# Patient Record
Sex: FEMALE | Race: WHITE | NOT HISPANIC OR LATINO | URBAN - METROPOLITAN AREA
[De-identification: names, ages, dates, MRNs, and addresses within clinical notes are randomized per-mention and may not be internally consistent; named-entity substitution may affect disease eponyms.]

---

## 2021-05-31 ENCOUNTER — INPATIENT (INPATIENT)
Facility: HOSPITAL | Age: 36
LOS: 5 days | Discharge: HOME | End: 2021-06-06
Attending: INTERNAL MEDICINE | Admitting: INTERNAL MEDICINE
Payer: MEDICAID

## 2021-05-31 ENCOUNTER — EMERGENCY (EMERGENCY)
Facility: HOSPITAL | Age: 36
LOS: 1 days | Discharge: HOME | End: 2021-05-31
Attending: EMERGENCY MEDICINE | Admitting: EMERGENCY MEDICINE
Payer: MEDICAID

## 2021-05-31 VITALS
OXYGEN SATURATION: 100 % | RESPIRATION RATE: 18 BRPM | TEMPERATURE: 98 F | SYSTOLIC BLOOD PRESSURE: 122 MMHG | DIASTOLIC BLOOD PRESSURE: 70 MMHG | HEART RATE: 94 BPM

## 2021-05-31 VITALS
RESPIRATION RATE: 18 BRPM | SYSTOLIC BLOOD PRESSURE: 137 MMHG | TEMPERATURE: 98 F | OXYGEN SATURATION: 99 % | DIASTOLIC BLOOD PRESSURE: 91 MMHG | HEART RATE: 97 BPM

## 2021-05-31 DIAGNOSIS — Z98.890 OTHER SPECIFIED POSTPROCEDURAL STATES: Chronic | ICD-10-CM

## 2021-05-31 PROCEDURE — 99283 EMERGENCY DEPT VISIT LOW MDM: CPT

## 2021-05-31 RX ORDER — CEPHALEXIN 500 MG
500 CAPSULE ORAL ONCE
Refills: 0 | Status: COMPLETED | OUTPATIENT
Start: 2021-05-31 | End: 2021-05-31

## 2021-05-31 RX ORDER — CEPHALEXIN 500 MG
1 CAPSULE ORAL
Qty: 21 | Refills: 0
Start: 2021-05-31 | End: 2021-06-06

## 2021-05-31 RX ADMIN — Medication 1 TABLET(S): at 17:04

## 2021-05-31 RX ADMIN — Medication 500 MILLIGRAM(S): at 17:04

## 2021-05-31 NOTE — ED PROVIDER NOTE - OBJECTIVE STATEMENT
36F s/p Brazillian butt lift + liposuction x9 days Dr. Mohr from [place] presents to ED for R leg cellulitis, no purulent discharge from incisional wounds, 6 inch erythematous indurated area of probably cellulitis over R thigh, no fevers/chills/constitutional symptoms, pt not willing to stay for IV abx, will d/c home on new antibiotic regimen, strict return protocols. 36F s/p Brazillian butt lift + liposuction x9 days Dr. Mohr from [place] presents to ED for R leg cellulitis, no purulent discharge from incisional wounds, 6 inch erythematous indurated area  over R thigh, no fevers/chills/constitutional symptoms, no pain with ranging hip, will d/c home on new antibiotic regimen, strict return protocols. 36F s/p Brazillian butt lift + liposuction x9 days Dr. Mohr from [place] presents to ED for R leg swelling x2 days. Pt states she was in [place] until 3 days ago and was examined by Dr. Mohr, sent home, pt was on an oral antibiotic she does not remember the name of which finished yesterday, pt denies fevers/chills, constitutional symptoms. pt states she has had clear discharge from her antterior liposuction inceisions but her posterior incisions have been healing well. Pt states redness over R hip has gotten worse today and her friend suggested she get evaluated in the ED. 36F s/p Brazillian butt lift + liposuction x9 days Dr. Mohr from [place] presents to ED for R leg swelling x2 days. Pt states she was in [place] until 3 days ago and was examined by Dr. Mohr, sent home, pt was on an oral antibiotic she does not remember the name of which finished yesterday, pt denies fevers/chills, constitutional symptoms. pt states she has had clear discharge from her anterior liposuction incisions but her posterior incisions have been healing well. Pt states redness over R hip has gotten worse today and her friend suggested she get evaluated in the ED.    TARUN

## 2021-05-31 NOTE — ED PROVIDER NOTE - NSFOLLOWUPINSTRUCTIONS_ED_ALL_ED_FT
You were seen in the Emergency Department for cellulitis over the R hip.    You are being sent a prescription for Cephalexin to be taken 3 times a day and Bactrim (TMP/SMX) to be taken twice a day for 7 days. Please see medication labels for instructions and warnings.     If you have fever, chills, nausea, vomiting, new or worsening pain, or if you have any new symptoms return to the Emergency Department. You were seen in the Emergency Department for cellulitis over the R hip.    You are being sent a prescription for Cephalexin to be taken 3 times a day and Bactrim (TMP/SMX) to be taken twice a day for 7 days. Please see medication labels for instructions and warnings.     Follow up with your primary care provider within the week.    If you have any concerns about your infection, if it begins to spread beyond the margins marked today, if you have any discharge from your incisions, If you have fevers, chills, weakness, lightheadedness, shortness of breath, nausea, vomiting, new or worsening pain, or if you have any new symptoms return to the Emergency Department.

## 2021-05-31 NOTE — ED ADULT TRIAGE NOTE - CHIEF COMPLAINT QUOTE
pt s/p lipo and BBL surgery in [place] 5/21. was previously seen for cellulitis and told to return if worsening. pt states "I feel warm," unsure if redness is worsening at right upper leg

## 2021-05-31 NOTE — ED PROVIDER NOTE - NS ED ROS FT
Constitutional:  (-) fever, (-) chills, (-) lethargy  Eyes:  (-) eye pain (-) visual changes  ENMT: (-) nasal discharge, (-) sore throat. (-) neck pain or stiffness  Cardiac: (-) chest pain (-) palpitations  Respiratory:  (-) cough (-) respiratory distress.   GI:  (-) nausea (-) vomiting (-) diarrhea (-) abdominal pain.  :  (-) dysuria (-) frequency (-) burning.  MS:  (-) back pain (-) joint pain.  Neuro:  (-) headache (-) numbness (-) tingling (-) focal weakness  Skin:  (+) rash  Except as documented in the HPI,  all other systems are negative

## 2021-05-31 NOTE — ED PROVIDER NOTE - PHYSICAL EXAMINATION
CONSTITUTIONAL: well-appearing, in NAD  SKIN: Large 6 inch area of indurated erythematous skin overlying R hip, well healing surgical scars over anterior abdomen with some serosanguinous discharge, well healing gluteal incisions with dissolvable stitches still in place  HEAD: NCAT  EYES: EOMI, PERRLA, no scleral icterus, conjunctiva pink  ENT: normal pharynx with no erythema or exudates  NECK: Supple; non tender. Full ROM.  CARD: RRR, no murmurs.  RESP: clear to ausculation b/l. No crackles or wheezing.  ABD: soft, non-tender, non-distended, no rebound or guarding.  EXT: no pedal edema, no calf tenderness  NEURO: normal motor. normal sensory. CN II-XII intact. Cerebellar testing normal. Normal gait.  PSYCH: Cooperative, appropriate. CONSTITUTIONAL: well-appearing, in NAD  SKIN: Large 6 inch area of indurated erythematous skin overlying R hip, well healing surgical scars over anterior abdomen with some serosanguinous discharge, well healing gluteal incisions with dissolvable stitches still in place  HEAD: NCAT  EYES: EOMI, PERRLA, no scleral icterus, conjunctiva pink  ENT: normal pharynx with no erythema or exudates  NECK: Supple; non tender. Full ROM.  CARD: RRR, no murmurs.  RESP: clear to ausculation b/l. No crackles or wheezing.  ABD: soft, non-tender, non-distended, no rebound or guarding.  EXT: no pedal edema, no calf tenderness  MSK: full ROM of R hip w/o tenderness   NEURO: normal motor. normal sensory. CN II-XII intact. Cerebellar testing normal. Normal gait.  PSYCH: Cooperative, appropriate.    TARUN

## 2021-05-31 NOTE — ED PROVIDER NOTE - CLINICAL SUMMARY MEDICAL DECISION MAKING FREE TEXT BOX
36F s/p Brazillian butt lift + liposuction x9 days Dr. Mohr from [place] presents to ED for R leg cellulitis, no purulent discharge from incisional wounds, 6 inch erythematous indurated area of probably cellulitis over R thigh, no fevers/chills/constitutional symptoms, pt not willing to stay for IV abx, will d/c home on new antibiotic regimen, strict return protocols. 36F s/p Brazillian butt lift + liposuction x9 days Dr. Mohr from [place] presents to ED for R leg cellulitis, no purulent discharge from incisional wounds, 6 inch erythematous indurated area of likely cellulitis over R thigh, no fevers/chills/constitutional symptoms, pt offered CDU stay for IV abx and monitoring of cellulitis but pt prefered d/c home with oral abx. Strict return precautions discussed such as fever, increased pain, increasing erythema (area marked with pen). d/c on keflex and bactrim

## 2021-05-31 NOTE — ED ADULT NURSE NOTE - OBJECTIVE STATEMENT
Pt AOX4 c/o pain, redness and swelling in Right upper thigh/hip area; pt is s/p liposuction and fat re-distribution surgery performed 5/20 in [place]; pt states she was on PO abx (cannot name the medication) until yesterday; bilateral gluteal fold incisions are dry and approximated; dissolvable sutures still visible; pt has 2 small open incisions to lower abdomen where she states the surgery practice was draining fluid; Left incision site draining small amount ser-sanguinous fluid; Area over trochanter and extending slightly down hip/thigh area is red, firm, tender to touch, approximately 20cm round area of redness noted; no weeping. Pt cannot bear weight on backside so she remains either kneeling or prone in stretcher. Pt AOX4 c/o pain, redness and swelling in Right upper thigh/hip area; pt is s/p liposuction and fat re-distribution to buttocks; surgery performed 5/20 in [place]; pt states she was on PO abx (cannot name the medication) until yesterday; bilateral gluteal fold incisions are dry and approximated; dissolvable sutures still visible; pt has 2 small open incisions to lower abdomen where she states the surgery practice was draining fluid; Left incision site draining small amount ser-sanguinous fluid; Area over trochanter and extending slightly down hip/thigh area is red, firm, tender to touch, approximately 20cm round area of redness noted; no weeping. Pt cannot bear weight on backside so she remains either kneeling or prone in stretcher. Awaiting MD walden and orders.

## 2021-05-31 NOTE — ED PROVIDER NOTE - PATIENT PORTAL LINK FT
You can access the FollowMyHealth Patient Portal offered by [facility] by registering at the following website: [email or web address]. By joining [facility]’s FollowMyHealth portal, you will also be able to view your health information using other applications (apps) compatible with our system.

## 2021-05-31 NOTE — ED ADULT TRIAGE NOTE - CHIEF COMPLAINT QUOTE
pt s/p liposuction and BBL 5/20, c/o area to right upper thigh that feels hard and warm to touch. unable t visualize in triage. denies fever/chills.

## 2021-05-31 NOTE — ED PROVIDER NOTE - PROGRESS NOTE DETAILS
Isaias Smallwood, DO PGY-1: Pt does not want to stay for IV abx, will take oral abx outpt and return if any changes noticed to margins of cellulitis, marked w/ skin pen, pt given strict return precautions

## 2021-05-31 NOTE — ED PROVIDER NOTE - CHIEF COMPLAINT
The patient is a 36y Female complaining of  The patient is a 36y Female complaining of rash over R hip

## 2021-06-01 LAB
ALBUMIN SERPL ELPH-MCNC: 3.9 G/DL — SIGNIFICANT CHANGE UP (ref 3.3–5)
ALP SERPL-CCNC: 61 U/L — SIGNIFICANT CHANGE UP (ref 40–120)
ALT FLD-CCNC: 45 U/L — HIGH (ref 4–33)
ANION GAP SERPL CALC-SCNC: 12 MMOL/L — SIGNIFICANT CHANGE UP (ref 7–14)
AST SERPL-CCNC: 30 U/L — SIGNIFICANT CHANGE UP (ref 4–32)
BASOPHILS # BLD AUTO: 0.01 K/UL — SIGNIFICANT CHANGE UP (ref 0–0.2)
BASOPHILS NFR BLD AUTO: 0.1 % — SIGNIFICANT CHANGE UP (ref 0–2)
BILIRUB SERPL-MCNC: 0.3 MG/DL — SIGNIFICANT CHANGE UP (ref 0.2–1.2)
BUN SERPL-MCNC: 18 MG/DL — SIGNIFICANT CHANGE UP (ref 7–23)
CALCIUM SERPL-MCNC: 11.4 MG/DL — HIGH (ref 8.4–10.5)
CHLORIDE SERPL-SCNC: 102 MMOL/L — SIGNIFICANT CHANGE UP (ref 98–107)
CO2 SERPL-SCNC: 23 MMOL/L — SIGNIFICANT CHANGE UP (ref 22–31)
CREAT SERPL-MCNC: 0.75 MG/DL — SIGNIFICANT CHANGE UP (ref 0.5–1.3)
EOSINOPHIL # BLD AUTO: 0.11 K/UL — SIGNIFICANT CHANGE UP (ref 0–0.5)
EOSINOPHIL NFR BLD AUTO: 1.2 % — SIGNIFICANT CHANGE UP (ref 0–6)
GLUCOSE SERPL-MCNC: 117 MG/DL — HIGH (ref 70–99)
HCT VFR BLD CALC: 30.6 % — LOW (ref 34.5–45)
HGB BLD-MCNC: 9.1 G/DL — LOW (ref 11.5–15.5)
IANC: 7.74 K/UL — SIGNIFICANT CHANGE UP (ref 1.5–8.5)
IMM GRANULOCYTES NFR BLD AUTO: 0.3 % — SIGNIFICANT CHANGE UP (ref 0–1.5)
LYMPHOCYTES # BLD AUTO: 0.61 K/UL — LOW (ref 1–3.3)
LYMPHOCYTES # BLD AUTO: 6.9 % — LOW (ref 13–44)
MCHC RBC-ENTMCNC: 26.1 PG — LOW (ref 27–34)
MCHC RBC-ENTMCNC: 29.7 GM/DL — LOW (ref 32–36)
MCV RBC AUTO: 87.9 FL — SIGNIFICANT CHANGE UP (ref 80–100)
MONOCYTES # BLD AUTO: 0.33 K/UL — SIGNIFICANT CHANGE UP (ref 0–0.9)
MONOCYTES NFR BLD AUTO: 3.7 % — SIGNIFICANT CHANGE UP (ref 2–14)
NEUTROPHILS # BLD AUTO: 7.74 K/UL — HIGH (ref 1.8–7.4)
NEUTROPHILS NFR BLD AUTO: 87.8 % — HIGH (ref 43–77)
NRBC # BLD: 0 /100 WBCS — SIGNIFICANT CHANGE UP
NRBC # FLD: 0 K/UL — SIGNIFICANT CHANGE UP
PLATELET # BLD AUTO: 295 K/UL — SIGNIFICANT CHANGE UP (ref 150–400)
POTASSIUM SERPL-MCNC: 4 MMOL/L — SIGNIFICANT CHANGE UP (ref 3.5–5.3)
POTASSIUM SERPL-SCNC: 4 MMOL/L — SIGNIFICANT CHANGE UP (ref 3.5–5.3)
PROT SERPL-MCNC: 6.6 G/DL — SIGNIFICANT CHANGE UP (ref 6–8.3)
RBC # BLD: 3.48 M/UL — LOW (ref 3.8–5.2)
RBC # FLD: 13.9 % — SIGNIFICANT CHANGE UP (ref 10.3–14.5)
SARS-COV-2 RNA SPEC QL NAA+PROBE: SIGNIFICANT CHANGE UP
SODIUM SERPL-SCNC: 137 MMOL/L — SIGNIFICANT CHANGE UP (ref 135–145)
WBC # BLD: 8.83 K/UL — SIGNIFICANT CHANGE UP (ref 3.8–10.5)
WBC # FLD AUTO: 8.83 K/UL — SIGNIFICANT CHANGE UP (ref 3.8–10.5)

## 2021-06-01 RX ORDER — KETOROLAC TROMETHAMINE 30 MG/ML
15 SYRINGE (ML) INJECTION EVERY 8 HOURS
Refills: 0 | Status: DISCONTINUED | OUTPATIENT
Start: 2021-06-01 | End: 2021-06-03

## 2021-06-01 RX ORDER — ACETAMINOPHEN 500 MG
650 TABLET ORAL ONCE
Refills: 0 | Status: COMPLETED | OUTPATIENT
Start: 2021-06-01 | End: 2021-06-01

## 2021-06-01 RX ORDER — ONDANSETRON 8 MG/1
4 TABLET, FILM COATED ORAL ONCE
Refills: 0 | Status: COMPLETED | OUTPATIENT
Start: 2021-06-01 | End: 2021-06-01

## 2021-06-01 RX ORDER — ACETAMINOPHEN 500 MG
975 TABLET ORAL ONCE
Refills: 0 | Status: COMPLETED | OUTPATIENT
Start: 2021-06-01 | End: 2021-06-01

## 2021-06-01 RX ORDER — CEFAZOLIN SODIUM 1 G
1000 VIAL (EA) INJECTION ONCE
Refills: 0 | Status: COMPLETED | OUTPATIENT
Start: 2021-06-01 | End: 2021-06-01

## 2021-06-01 RX ORDER — CEFAZOLIN SODIUM 1 G
1000 VIAL (EA) INJECTION EVERY 8 HOURS
Refills: 0 | Status: DISCONTINUED | OUTPATIENT
Start: 2021-06-01 | End: 2021-06-02

## 2021-06-01 RX ADMIN — Medication 1 TABLET(S): at 07:33

## 2021-06-01 RX ADMIN — Medication 975 MILLIGRAM(S): at 16:05

## 2021-06-01 RX ADMIN — Medication 650 MILLIGRAM(S): at 16:05

## 2021-06-01 RX ADMIN — Medication 100 MILLIGRAM(S): at 15:57

## 2021-06-01 RX ADMIN — Medication 650 MILLIGRAM(S): at 07:33

## 2021-06-01 RX ADMIN — Medication 100 MILLIGRAM(S): at 09:05

## 2021-06-01 RX ADMIN — Medication 975 MILLIGRAM(S): at 16:35

## 2021-06-01 RX ADMIN — Medication 1 TABLET(S): at 17:46

## 2021-06-01 RX ADMIN — Medication 15 MILLIGRAM(S): at 23:24

## 2021-06-01 RX ADMIN — Medication 100 MILLIGRAM(S): at 01:17

## 2021-06-01 RX ADMIN — ONDANSETRON 4 MILLIGRAM(S): 8 TABLET, FILM COATED ORAL at 05:20

## 2021-06-01 NOTE — ED CDU PROVIDER INITIAL DAY NOTE - ATTENDING CONTRIBUTION TO CARE
36F with no pmh presenting as return visit for worsening cellulitis x2days. Patient seen in ED earlier today for same complaints and advised to return for worsening symptoms. Patient s/p Brazillian butt lift + liposuction x9 days ago from [place]. Patient states was on an unknown single prophylactic oral antibiotic from her plastic surgeon with last dose earlier today. Denies fever, chills, cp, sob, nausea, vomiting, diarrhea, dysuria, headache, weakness    Patient presenting with progression of cellulitis. No signs of collection or abscess. No systemic signs or symptoms. Given rapid progression of borders, to start on IV abx with dispo to CDU for close monitoring.

## 2021-06-01 NOTE — ED ADULT NURSE NOTE - CHIEF COMPLAINT QUOTE
pt s/p lipo and BBL surgery in [place] 5/21. was previously seen for cellulitis and told to return if worsening. pt states "I feel warm," unsure if redness is worsening at right upper leg

## 2021-06-01 NOTE — ED CDU PROVIDER INITIAL DAY NOTE - PROGRESS NOTE DETAILS
ZACK ARZATE: pt resting comfortablyin bed getting iv abx, nga continue ancef, bactrim and reasses Notified patient temp 100.   tylenol 650mg rxed Patient reexamined, some improvement noted form demarcated line on right anterior leg/ thigh.

## 2021-06-01 NOTE — ED PROVIDER NOTE - NS ED ROS FT
GENERAL: No fever or chills  EYES: no change in vision  HEENT: no trouble swallowing or speaking  CARDIAC: no chest pain or palpitations  PULMONARY: no cough or SOB  GI: no abdominal pain, nausea, vomiting, diarrhea, or constipation   : No changes in urination  SKIN: rash   NEURO: no headache, numbness, or weakness  MSK: No joint pain

## 2021-06-01 NOTE — ED ADULT NURSE REASSESSMENT NOTE - NS ED NURSE REASSESS COMMENT FT1
Patient is A&Ox4. Respirations even and unlabored. Dimmed lights for comfort. PO fluids provided. Stretcher in lowest position, Wheels locked, appropriate side rails in place, call bell in reach.

## 2021-06-01 NOTE — ED PROVIDER NOTE - OBJECTIVE STATEMENT
36F with no pmh presenting as return visit for worsening cellulitis. Patient seen in ED earlier today for same complaints and advised to return for worsening symptoms. Patient s/p Brazillian butt lift + liposuction x9 days ago from [place]. Patient was on an oral antibiotic from her plastic surgeon with last dose today with unclear name of prescription. 36F with no pmh presenting as return visit for worsening cellulitis x2days. Patient seen in ED earlier today for same complaints and advised to return for worsening symptoms. Patient s/p Brazillian butt lift + liposuction x9 days ago from [place]. Patient states was on an unknown single prophylactic oral antibiotic from her plastic surgeon with last dose earlier today. Denies fever, chills, cp, sob, nausea, vomiting, diarrhea, dysuria, headache, weakness

## 2021-06-01 NOTE — ED CDU PROVIDER INITIAL DAY NOTE - PHYSICAL EXAMINATION
GEN: NAD, awake, well appearing  HEENT: NCAT, MMM, normal conjunctiva, perrl  CHEST/LUNGS: Non-tachypneic, CTAB, bilateral breath sounds  CARDIAC: Non-tachycardic, s1s2, normal perfusion, no peripheral edema  ABDOMEN: Soft, NTND, No rebound/guarding  MSK: No joint tenderness, no gross deformity of extremities  SKIN: marked area of indurated skin overlying right hip with borders marked from previous visit. New borders remarked. No area of fluctuance. FROM of hip joint without discomfort. surgical scars noted c/d/i ++ warm, + erythematous  NEURO: CN grossly intact, normal coordination, no focal motor or sensory deficits  PSYCH: Alert, appropriate, cooperative  - jemma wilson

## 2021-06-01 NOTE — ED ADULT NURSE NOTE - OBJECTIVE STATEMENT
Pt received in intake room 12. Pt A&Ox4, no pmhx, returning to the ED for worsening cellulitis for the last 2 days. Pt states she was seen in ED earlier in the day for the same complaint and was advised to return for worsening of her symptoms. Pt states she had a brazilian butt lift and liposuction 9 days ago in [place]. Pt denies any cp, sob, abd pain, ha, dizziness, n/v/d, fevers/chills. Respirations even and unlabored, no accessory muscle use. 20G placed to L AC, labs sent. Stretcher in lowest position, side rails up, call bell within reach.

## 2021-06-01 NOTE — ED CDU PROVIDER INITIAL DAY NOTE - OBJECTIVE STATEMENT
36F with no pmh presenting as return visit for worsening cellulitis x2days. Patient seen in ED earlier today for same complaints and advised to return for worsening symptoms. Patient is s/p Brazillian butt lift + liposuction x9 days ago from [place] and has been having right hip pain, erytehma, swelling, warmth, Ppt ha sbeen taking keflex  x 1 dose with no relief, so returned to the ER. On first visit to ER , they performed a bed side u/s which showed no fluid collection. Pt denies any headaches, neck pain, cough,  fever, chills, cp, sob, nausea, vomiting, diarrhea, dysuria, headache, weakness  sent to cdu for abx, reasses

## 2021-06-01 NOTE — ED CDU PROVIDER INITIAL DAY NOTE - NS ED ROS FT
GENERAL: No fever or chills  EYES: no change in vision  HEENT: no trouble swallowing or speaking  CARDIAC: no chest pain or palpitations  PULMONARY: no cough or SOB  GI: no abdominal pain, nausea, vomiting, diarrhea, or constipation   : No changes in urination  SKIN: rash to right hip  NEURO: no headache, numbness, or weakness  MSK: No joint pain  - jemma wilson

## 2021-06-02 DIAGNOSIS — L03.90 CELLULITIS, UNSPECIFIED: ICD-10-CM

## 2021-06-02 DIAGNOSIS — D64.9 ANEMIA, UNSPECIFIED: ICD-10-CM

## 2021-06-02 DIAGNOSIS — Z29.9 ENCOUNTER FOR PROPHYLACTIC MEASURES, UNSPECIFIED: ICD-10-CM

## 2021-06-02 DIAGNOSIS — A41.9 SEPSIS, UNSPECIFIED ORGANISM: ICD-10-CM

## 2021-06-02 DIAGNOSIS — R11.0 NAUSEA: ICD-10-CM

## 2021-06-02 DIAGNOSIS — Z90.49 ACQUIRED ABSENCE OF OTHER SPECIFIED PARTS OF DIGESTIVE TRACT: Chronic | ICD-10-CM

## 2021-06-02 DIAGNOSIS — R51.9 HEADACHE, UNSPECIFIED: ICD-10-CM

## 2021-06-02 LAB
ANION GAP SERPL CALC-SCNC: 9 MMOL/L — SIGNIFICANT CHANGE UP (ref 7–14)
BASOPHILS # BLD AUTO: 0.01 K/UL — SIGNIFICANT CHANGE UP (ref 0–0.2)
BASOPHILS NFR BLD AUTO: 0.1 % — SIGNIFICANT CHANGE UP (ref 0–2)
BUN SERPL-MCNC: 15 MG/DL — SIGNIFICANT CHANGE UP (ref 7–23)
CALCIUM SERPL-MCNC: 10.5 MG/DL — SIGNIFICANT CHANGE UP (ref 8.4–10.5)
CHLORIDE SERPL-SCNC: 102 MMOL/L — SIGNIFICANT CHANGE UP (ref 98–107)
CO2 SERPL-SCNC: 23 MMOL/L — SIGNIFICANT CHANGE UP (ref 22–31)
CREAT SERPL-MCNC: 0.89 MG/DL — SIGNIFICANT CHANGE UP (ref 0.5–1.3)
EOSINOPHIL # BLD AUTO: 0.23 K/UL — SIGNIFICANT CHANGE UP (ref 0–0.5)
EOSINOPHIL NFR BLD AUTO: 2.7 % — SIGNIFICANT CHANGE UP (ref 0–6)
GLUCOSE SERPL-MCNC: 127 MG/DL — HIGH (ref 70–99)
HCT VFR BLD CALC: 30.6 % — LOW (ref 34.5–45)
HGB BLD-MCNC: 9.5 G/DL — LOW (ref 11.5–15.5)
IANC: 7.79 K/UL — SIGNIFICANT CHANGE UP (ref 1.5–8.5)
IMM GRANULOCYTES NFR BLD AUTO: 0.5 % — SIGNIFICANT CHANGE UP (ref 0–1.5)
LYMPHOCYTES # BLD AUTO: 0.28 K/UL — LOW (ref 1–3.3)
LYMPHOCYTES # BLD AUTO: 3.2 % — LOW (ref 13–44)
MCHC RBC-ENTMCNC: 26.5 PG — LOW (ref 27–34)
MCHC RBC-ENTMCNC: 31 GM/DL — LOW (ref 32–36)
MCV RBC AUTO: 85.5 FL — SIGNIFICANT CHANGE UP (ref 80–100)
MONOCYTES # BLD AUTO: 0.29 K/UL — SIGNIFICANT CHANGE UP (ref 0–0.9)
MONOCYTES NFR BLD AUTO: 3.4 % — SIGNIFICANT CHANGE UP (ref 2–14)
NEUTROPHILS # BLD AUTO: 7.79 K/UL — HIGH (ref 1.8–7.4)
NEUTROPHILS NFR BLD AUTO: 90.1 % — HIGH (ref 43–77)
NRBC # BLD: 0 /100 WBCS — SIGNIFICANT CHANGE UP
NRBC # FLD: 0 K/UL — SIGNIFICANT CHANGE UP
PLATELET # BLD AUTO: 228 K/UL — SIGNIFICANT CHANGE UP (ref 150–400)
POTASSIUM SERPL-MCNC: 4.5 MMOL/L — SIGNIFICANT CHANGE UP (ref 3.5–5.3)
POTASSIUM SERPL-SCNC: 4.5 MMOL/L — SIGNIFICANT CHANGE UP (ref 3.5–5.3)
RBC # BLD: 3.58 M/UL — LOW (ref 3.8–5.2)
RBC # FLD: 13.8 % — SIGNIFICANT CHANGE UP (ref 10.3–14.5)
SODIUM SERPL-SCNC: 134 MMOL/L — LOW (ref 135–145)
WBC # BLD: 8.64 K/UL — SIGNIFICANT CHANGE UP (ref 3.8–10.5)
WBC # FLD AUTO: 8.64 K/UL — SIGNIFICANT CHANGE UP (ref 3.8–10.5)

## 2021-06-02 PROCEDURE — 99223 1ST HOSP IP/OBS HIGH 75: CPT

## 2021-06-02 PROCEDURE — 74177 CT ABD & PELVIS W/CONTRAST: CPT | Mod: 26

## 2021-06-02 RX ORDER — OXYCODONE AND ACETAMINOPHEN 5; 325 MG/1; MG/1
1 TABLET ORAL ONCE
Refills: 0 | Status: DISCONTINUED | OUTPATIENT
Start: 2021-06-02 | End: 2021-06-02

## 2021-06-02 RX ORDER — ACETAMINOPHEN 500 MG
650 TABLET ORAL ONCE
Refills: 0 | Status: COMPLETED | OUTPATIENT
Start: 2021-06-02 | End: 2021-06-02

## 2021-06-02 RX ORDER — KETOROLAC TROMETHAMINE 30 MG/ML
15 SYRINGE (ML) INJECTION ONCE
Refills: 0 | Status: DISCONTINUED | OUTPATIENT
Start: 2021-06-02 | End: 2021-06-02

## 2021-06-02 RX ORDER — SENNA PLUS 8.6 MG/1
2 TABLET ORAL AT BEDTIME
Refills: 0 | Status: DISCONTINUED | OUTPATIENT
Start: 2021-06-02 | End: 2021-06-06

## 2021-06-02 RX ORDER — ENOXAPARIN SODIUM 100 MG/ML
40 INJECTION SUBCUTANEOUS EVERY 24 HOURS
Refills: 0 | Status: DISCONTINUED | OUTPATIENT
Start: 2021-06-02 | End: 2021-06-06

## 2021-06-02 RX ORDER — SODIUM CHLORIDE 9 MG/ML
1000 INJECTION INTRAMUSCULAR; INTRAVENOUS; SUBCUTANEOUS
Refills: 0 | Status: DISCONTINUED | OUTPATIENT
Start: 2021-06-02 | End: 2021-06-03

## 2021-06-02 RX ORDER — VANCOMYCIN HCL 1 G
1000 VIAL (EA) INTRAVENOUS EVERY 12 HOURS
Refills: 0 | Status: DISCONTINUED | OUTPATIENT
Start: 2021-06-02 | End: 2021-06-04

## 2021-06-02 RX ORDER — OXYCODONE HYDROCHLORIDE 5 MG/1
5 TABLET ORAL EVERY 6 HOURS
Refills: 0 | Status: DISCONTINUED | OUTPATIENT
Start: 2021-06-02 | End: 2021-06-03

## 2021-06-02 RX ORDER — ACETAMINOPHEN 500 MG
650 TABLET ORAL EVERY 6 HOURS
Refills: 0 | Status: DISCONTINUED | OUTPATIENT
Start: 2021-06-02 | End: 2021-06-06

## 2021-06-02 RX ORDER — SODIUM CHLORIDE 9 MG/ML
1000 INJECTION INTRAMUSCULAR; INTRAVENOUS; SUBCUTANEOUS ONCE
Refills: 0 | Status: COMPLETED | OUTPATIENT
Start: 2021-06-02 | End: 2021-06-02

## 2021-06-02 RX ORDER — METOCLOPRAMIDE HCL 10 MG
10 TABLET ORAL ONCE
Refills: 0 | Status: COMPLETED | OUTPATIENT
Start: 2021-06-02 | End: 2021-06-02

## 2021-06-02 RX ADMIN — Medication 1 TABLET(S): at 05:45

## 2021-06-02 RX ADMIN — Medication 100 MILLIGRAM(S): at 16:08

## 2021-06-02 RX ADMIN — Medication 100 MILLIGRAM(S): at 07:45

## 2021-06-02 RX ADMIN — Medication 15 MILLIGRAM(S): at 08:30

## 2021-06-02 RX ADMIN — Medication 1000 MILLIGRAM(S): at 00:56

## 2021-06-02 RX ADMIN — Medication 650 MILLIGRAM(S): at 23:23

## 2021-06-02 RX ADMIN — SODIUM CHLORIDE 150 MILLILITER(S): 9 INJECTION INTRAMUSCULAR; INTRAVENOUS; SUBCUTANEOUS at 18:54

## 2021-06-02 RX ADMIN — OXYCODONE HYDROCHLORIDE 5 MILLIGRAM(S): 5 TABLET ORAL at 23:36

## 2021-06-02 RX ADMIN — Medication 250 MILLIGRAM(S): at 23:36

## 2021-06-02 RX ADMIN — OXYCODONE AND ACETAMINOPHEN 1 TABLET(S): 5; 325 TABLET ORAL at 00:56

## 2021-06-02 RX ADMIN — Medication 100 MILLIGRAM(S): at 00:18

## 2021-06-02 RX ADMIN — Medication 650 MILLIGRAM(S): at 18:54

## 2021-06-02 RX ADMIN — Medication 10 MILLIGRAM(S): at 08:00

## 2021-06-02 RX ADMIN — Medication 15 MILLIGRAM(S): at 08:04

## 2021-06-02 RX ADMIN — Medication 650 MILLIGRAM(S): at 08:04

## 2021-06-02 RX ADMIN — Medication 650 MILLIGRAM(S): at 22:23

## 2021-06-02 RX ADMIN — Medication 650 MILLIGRAM(S): at 08:30

## 2021-06-02 RX ADMIN — SODIUM CHLORIDE 1000 MILLILITER(S): 9 INJECTION INTRAMUSCULAR; INTRAVENOUS; SUBCUTANEOUS at 15:09

## 2021-06-02 NOTE — ED CDU PROVIDER SUBSEQUENT DAY NOTE - HISTORY
Pt is a 37 y/o F PMHx recent brazillian butt lift and liposuction 10 days ago p/w worsening cellulitis x 3 days.  Pt reports developing gradual onset redness and pain at skin at region of right hip.  Was seen in ED and was prescribed antibiotics.  Pt returned to ED due to continued redness and pain.  Pt denies any fevers, chills, numbness, weakness, chest pain, SOB, numbness, weakness.  Pt was admitted to observation unit for antibiotics.

## 2021-06-02 NOTE — H&P ADULT - NSICDXFAMILYHX_GEN_ALL_CORE_FT
FAMILY HISTORY:  Father  Still living? Unknown  Family history of diabetes mellitus (DM), Age at diagnosis: Age Unknown    Mother  Still living? Unknown  Family history of diabetes mellitus (DM), Age at diagnosis: Age Unknown  Family history of ovarian cancer, Age at diagnosis: Age Unknown

## 2021-06-02 NOTE — H&P ADULT - NSHPPHYSICALEXAM_GEN_ALL_CORE
PHYSICAL EXAM:  GENERAL: NAD, well-developed, well-nourished  HEAD:  Atraumatic, Normocephalic  ENT: MMM  EYES: PERRL, conjunctiva and sclera clear  NECK: Supple, No LAD, no bony spinal TTP  CHEST/LUNG: Clear to auscultation bilaterally; No wheezes, rales or rhonchi  HEART: Regular rate and rhythm; No murmurs, rubs, or gallops, (+)S1, S2  ABDOMEN: Soft, Nontender, Nondistended; Normal Bowel sounds   EXTREMITIES:  2+ Peripheral Pulses, No clubbing, cyanosis, or edema  PSYCH: normal mood and affect  NEUROLOGY: AAOx3, non-focal  SKIN: small, well healing 0.5cm incisions b/l lower abdomen, overlying lower thoracic spine, b/l flanks and at superior gluteal crease without surrounding erythema or drainage; R hip with circumscribed erythema w/fullness at the center, TTP, no drainage appreciated

## 2021-06-02 NOTE — H&P ADULT - NSHPSOCIALHISTORY_GEN_ALL_CORE
Lives alone  Social smoker (no recent use secondary to surgery)  No recent alcohol use (in setting of recent surgery), previously a social drinker  Denies illicit drug use

## 2021-06-02 NOTE — ED CDU PROVIDER SUBSEQUENT DAY NOTE - PROGRESS NOTE DETAILS
CDU course today, patient this am spike temp 100.9, cultures added, CT a/p thigh performer with no abscess or gas formation. blood cultures sent  Erythema is extending out side demarcated line toward flank/ hip on right leg.   Spoke with ID will change patient to vanco. will admit to medicine, for further abx and fu blood cultures

## 2021-06-02 NOTE — H&P ADULT - NSHPLABSRESULTS_GEN_ALL_CORE
9.5    8.64  )-----------( 228      ( 02 Jun 2021 06:42 )             30.6     06-02    134<L>  |  102  |  15  ----------------------------<  127<H>  4.5   |  23  |  0.89    Ca    10.5      02 Jun 2021 06:42    TPro  6.6  /  Alb  3.9  /  TBili  0.3  /  DBili  x   /  AST  30  /  ALT  45<H>  /  AlkPhos  61  06-01 6/01/21 ED urine hCG POCT: Negative    COVID-19 PCR: NotDetec (06.01.21 @ 01:25)    < from: CT Abdomen and Pelvis w/ IV Cont (06.02.21 @ 13:57) >  LOWER CHEST: Breast implants.  LIVER: Within normal limits.  BILE DUCTS: Normal caliber.  GALLBLADDER: Within normal limits.  SPLEEN: Within normal limits.  PANCREAS: Within normal limits.  ADRENALS: Within normal limits.  KIDNEYS/URETERS: Within normal limits.  BLADDER: Nondistended.  REPRODUCTIVE ORGANS: Uterus and adnexa appear unremarkable.  BOWEL: No bowel obstruction. Appendix surgically absent.  PERITONEUM: No ascites.  VESSELS: Within normal limits.  RETROPERITONEUM/LYMPH NODES: Mild enlargement right inguinal and external iliac lymph nodes.  ABDOMINAL WALL: Nonloculated fluid and soft tissue infiltration in the subcutaneous fat of the anterior abdominal wall and both flanks. Skin thickening overlying the right flank. No subcutaneous air. No abscess.  BONES: Within normal limits.  IMPRESSION: Postsurgical changes in the abdominal wall and both flanks. No CT evidence of abscess or gas forming infection.  < end of copied text >    EKG ordered/pending

## 2021-06-02 NOTE — H&P ADULT - HISTORY OF PRESENT ILLNESS
36-year-old female with no significant past medical history, presented from home with R hip erythema. Patient was initially seen in the ED on 5/31 and sent home with PO abx (cephalexin and trimethoprim/sulfamethoxazole). She returned due to worsening erythema on 6/01 and was admitted to the CDU. She spiked a temp of 100.9 and was admitted to medicine. Patient notes recent Brazilian butt lift w/lipo procedure on 5/21/21 in [place]. She was sent home on unknown PO abx (took 3x/day x7 days, completed) and PO analgesics (also completed Rx). She notes she was supposed to be wearing compression garments and getting lymphatic drainage however has not been able to do so since arrival. She reports 1,000/10 holocranial headache (typical of her migraine headaches that she gets occasionally), with associated nausea, as well as fevers/chills, sweating, and neck and back pain described as an ache that she relates to the surgery as well as laying on her stomach (she reports being unable to sleep on her back).     In the CDU VS: 100.9    100-143/51-88  15-19  %RA, rece 36-year-old female with no significant past medical history, presented from home with R hip erythema. Patient was initially seen in the ED on 5/31 and sent home with PO abx (cephalexin and trimethoprim/sulfamethoxazole). She returned due to worsening erythema on 6/01 and was admitted to the CDU. She spiked a temp of 100.9 and was admitted to medicine. Patient notes recent Brazilian butt lift w/lipo procedure on 5/21/21 in [place]. She was sent home on unknown PO abx (took 3x/day x7 days, completed) and PO analgesics (also completed Rx). She notes she was supposed to be wearing compression garments and getting lymphatic drainage however has not been able to do so since arrival. She reports 1,000/10 holocranial headache (typical of her migraine headaches that she gets occasionally), with associated nausea, as well as fevers/chills, sweating, and neck and back pain described as an ache that she relates to the surgery as well as laying on her stomach (she reports being unable to sleep on her back).     In the CDU VS: 100.9    100-143/51-88  15-19  %RA, received trimethoprim/sulfamethoxazole 160/800mg PO x3, cefazolin 1g IVPB x6, acetaminophen 650mg PO x3 and 975mg PO x1, ketorolac 15mg IV x2, ondansetron 4mg IV x1, metoclopramide 10mg IV x1, oxycodone/acetaminophen 5/325mg PO x1, ordered for vancomycin 1g Q12

## 2021-06-02 NOTE — H&P ADULT - PROBLEM SELECTOR PLAN 1
Per ED note: abx discussed with ID, recommended vancomycin  Would f/u with ID in AM regarding further recs  No leukocytosis, will check lactate with AM labs, c/w IVF overnight  CT as above  continue to monitor R hip erythema  f/u Cx

## 2021-06-02 NOTE — ED CDU PROVIDER SUBSEQUENT DAY NOTE - ATTENDING CONTRIBUTION TO CARE
cellulitis to right hip region s/p brazilian butt lift surgery  patient with fever, on IV abx  area red, warmth  FROM at hip    continue IV abx, may need admit if fevers continue

## 2021-06-02 NOTE — H&P ADULT - NSICDXPASTSURGICALHX_GEN_ALL_CORE_FT
PAST SURGICAL HISTORY:  History of appendectomy     S/P cosmetic plastic surgery Breast augmentation (past); Brazilian butt lift/lipo 5/21/21

## 2021-06-02 NOTE — ED CDU PROVIDER DISPOSITION NOTE - CLINICAL COURSE
35 y/o F PMHx recent brazillian butt lift and liposuction 10 days ago p/w worsening cellulitis x 3 days.  Pt reports developing gradual onset redness and pain at skin at region of right hip.  Was seen in ED and was prescribed antibiotics.  Pt returned to ED due to continued redness and pain.   Pt was admitted to observation unit for antibiotics. patient this am spike temp 100.9, cultures added, CT a/p thigh performed with no abscess or gas formation. blood cultures sent  Erythema is extending out side demarcated line toward flank/ hip on right leg.   Spoke with ID will change patient to vanco. will admit to medicine, for further abx and fu blood cultures

## 2021-06-02 NOTE — ED CDU PROVIDER SUBSEQUENT DAY NOTE - PHYSICAL EXAMINATION
Right hip/thigh:  area of induration and erythema with tenderness; no crepitus; + warmth; no fluctuance; soft compartments; distally 5/5 strength; sensation intact to light touch, < 2 sec capillary refill; no LE edema; no calf tenderness; no palpable cords

## 2021-06-02 NOTE — ED CDU PROVIDER SUBSEQUENT DAY NOTE - FAMILY HISTORY
Father  Still living? Unknown  Family history of diabetes mellitus (DM), Age at diagnosis: Age Unknown     Mother  Still living? Unknown  Family history of diabetes mellitus (DM), Age at diagnosis: Age Unknown

## 2021-06-02 NOTE — H&P ADULT - NSHPREVIEWOFSYSTEMS_GEN_ALL_CORE
REVIEW OF SYSTEMS:    CONSTITUTIONAL: (+) fevers/chills  EYES/ENT: No visual changes;  No dysphagia; No sore throat; No rhinorrhea; No sinus pain/pressure  NECK: (+)neck and back pain; No stiffness  RESPIRATORY: No cough, wheezing; No shortness of breath  CARDIOVASCULAR: No chest pain or palpitations; No lower extremity edema  GASTROINTESTINAL: No abdominal or epigastric pain. (+) nausea; No vomiting; No diarrhea; (+) constipation, last BM 2-3 days ago, (+)flatus. No melena or hematochezia.  GENITOURINARY: No dysuria, frequency or hematuria  NEUROLOGICAL: No numbness, paresthesias or weakness; (+)intermittent LH/dizziness with standing; No syncope  MSK: ambulates without aid; No falls; (+)neck/back pain  SKIN: Redness R hip, TTP  All other review of systems is negative unless indicated above.

## 2021-06-02 NOTE — H&P ADULT - PROBLEM SELECTOR PLAN 6
#DVT ppx - enoxaparin given recent sx and decreased mobility  #can consult plastics in AM regarding need for compressive garments

## 2021-06-02 NOTE — ED CDU PROVIDER SUBSEQUENT DAY NOTE - MEDICAL DECISION MAKING DETAILS
Pt is a 35 y/[facility] F [facility] p/w worsening cellulitis x 3 days.  -- cellulitis -- antibiotics, reassess, pain control

## 2021-06-03 LAB
ALBUMIN SERPL ELPH-MCNC: 3.4 G/DL — SIGNIFICANT CHANGE UP (ref 3.3–5)
ALP SERPL-CCNC: 54 U/L — SIGNIFICANT CHANGE UP (ref 40–120)
ALT FLD-CCNC: 20 U/L — SIGNIFICANT CHANGE UP (ref 4–33)
ANION GAP SERPL CALC-SCNC: 11 MMOL/L — SIGNIFICANT CHANGE UP (ref 7–14)
AST SERPL-CCNC: 12 U/L — SIGNIFICANT CHANGE UP (ref 4–32)
BASOPHILS # BLD AUTO: 0.01 K/UL — SIGNIFICANT CHANGE UP (ref 0–0.2)
BASOPHILS NFR BLD AUTO: 0.1 % — SIGNIFICANT CHANGE UP (ref 0–2)
BILIRUB SERPL-MCNC: 0.4 MG/DL — SIGNIFICANT CHANGE UP (ref 0.2–1.2)
BLOOD GAS VENOUS COMPREHENSIVE RESULT: SIGNIFICANT CHANGE UP
BUN SERPL-MCNC: 8 MG/DL — SIGNIFICANT CHANGE UP (ref 7–23)
CALCIUM SERPL-MCNC: 9.9 MG/DL — SIGNIFICANT CHANGE UP (ref 8.4–10.5)
CHLORIDE SERPL-SCNC: 104 MMOL/L — SIGNIFICANT CHANGE UP (ref 98–107)
CO2 SERPL-SCNC: 20 MMOL/L — LOW (ref 22–31)
COVID-19 SPIKE DOMAIN AB INTERP: POSITIVE
COVID-19 SPIKE DOMAIN ANTIBODY RESULT: 13.1 U/ML — HIGH
CREAT SERPL-MCNC: 0.59 MG/DL — SIGNIFICANT CHANGE UP (ref 0.5–1.3)
EOSINOPHIL # BLD AUTO: 0.24 K/UL — SIGNIFICANT CHANGE UP (ref 0–0.5)
EOSINOPHIL NFR BLD AUTO: 3.1 % — SIGNIFICANT CHANGE UP (ref 0–6)
FERRITIN SERPL-MCNC: 107 NG/ML — SIGNIFICANT CHANGE UP (ref 15–150)
GLUCOSE SERPL-MCNC: 129 MG/DL — HIGH (ref 70–99)
HCT VFR BLD CALC: 28 % — LOW (ref 34.5–45)
HGB BLD-MCNC: 8.6 G/DL — LOW (ref 11.5–15.5)
IANC: 6.73 K/UL — SIGNIFICANT CHANGE UP (ref 1.5–8.5)
IMM GRANULOCYTES NFR BLD AUTO: 0.5 % — SIGNIFICANT CHANGE UP (ref 0–1.5)
IRON SATN MFR SERPL: 13 UG/DL — LOW (ref 30–160)
IRON SATN MFR SERPL: 6 % — LOW (ref 14–50)
LYMPHOCYTES # BLD AUTO: 0.45 K/UL — LOW (ref 1–3.3)
LYMPHOCYTES # BLD AUTO: 5.7 % — LOW (ref 13–44)
MAGNESIUM SERPL-MCNC: 1.9 MG/DL — SIGNIFICANT CHANGE UP (ref 1.6–2.6)
MCHC RBC-ENTMCNC: 26.5 PG — LOW (ref 27–34)
MCHC RBC-ENTMCNC: 30.7 GM/DL — LOW (ref 32–36)
MCV RBC AUTO: 86.4 FL — SIGNIFICANT CHANGE UP (ref 80–100)
MONOCYTES # BLD AUTO: 0.38 K/UL — SIGNIFICANT CHANGE UP (ref 0–0.9)
MONOCYTES NFR BLD AUTO: 4.8 % — SIGNIFICANT CHANGE UP (ref 2–14)
NEUTROPHILS # BLD AUTO: 6.73 K/UL — SIGNIFICANT CHANGE UP (ref 1.8–7.4)
NEUTROPHILS NFR BLD AUTO: 85.8 % — HIGH (ref 43–77)
NRBC # BLD: 0 /100 WBCS — SIGNIFICANT CHANGE UP
NRBC # FLD: 0 K/UL — SIGNIFICANT CHANGE UP
PHOSPHATE SERPL-MCNC: 1.6 MG/DL — LOW (ref 2.5–4.5)
PLATELET # BLD AUTO: 226 K/UL — SIGNIFICANT CHANGE UP (ref 150–400)
POTASSIUM SERPL-MCNC: 4.2 MMOL/L — SIGNIFICANT CHANGE UP (ref 3.5–5.3)
POTASSIUM SERPL-SCNC: 4.2 MMOL/L — SIGNIFICANT CHANGE UP (ref 3.5–5.3)
PROT SERPL-MCNC: 6.2 G/DL — SIGNIFICANT CHANGE UP (ref 6–8.3)
RBC # BLD: 3.24 M/UL — LOW (ref 3.8–5.2)
RBC # FLD: 13.7 % — SIGNIFICANT CHANGE UP (ref 10.3–14.5)
SARS-COV-2 IGG+IGM SERPL QL IA: 13.1 U/ML — HIGH
SARS-COV-2 IGG+IGM SERPL QL IA: POSITIVE
SODIUM SERPL-SCNC: 135 MMOL/L — SIGNIFICANT CHANGE UP (ref 135–145)
TIBC SERPL-MCNC: 220 UG/DL — SIGNIFICANT CHANGE UP (ref 220–430)
UIBC SERPL-MCNC: 207 UG/DL — SIGNIFICANT CHANGE UP (ref 110–370)
WBC # BLD: 7.85 K/UL — SIGNIFICANT CHANGE UP (ref 3.8–10.5)
WBC # FLD AUTO: 7.85 K/UL — SIGNIFICANT CHANGE UP (ref 3.8–10.5)

## 2021-06-03 PROCEDURE — 99233 SBSQ HOSP IP/OBS HIGH 50: CPT | Mod: GC

## 2021-06-03 PROCEDURE — 99222 1ST HOSP IP/OBS MODERATE 55: CPT

## 2021-06-03 RX ORDER — FERROUS SULFATE 325(65) MG
325 TABLET ORAL DAILY
Refills: 0 | Status: DISCONTINUED | OUTPATIENT
Start: 2021-06-03 | End: 2021-06-06

## 2021-06-03 RX ORDER — FERROUS SULFATE 325(65) MG
1 TABLET ORAL
Qty: 30 | Refills: 0
Start: 2021-06-03 | End: 2021-07-02

## 2021-06-03 RX ORDER — IBUPROFEN 200 MG
400 TABLET ORAL ONCE
Refills: 0 | Status: COMPLETED | OUTPATIENT
Start: 2021-06-03 | End: 2021-06-03

## 2021-06-03 RX ORDER — KETOROLAC TROMETHAMINE 30 MG/ML
15 SYRINGE (ML) INJECTION EVERY 8 HOURS
Refills: 0 | Status: DISCONTINUED | OUTPATIENT
Start: 2021-06-03 | End: 2021-06-04

## 2021-06-03 RX ORDER — KETOROLAC TROMETHAMINE 30 MG/ML
15 SYRINGE (ML) INJECTION EVERY 8 HOURS
Refills: 0 | Status: DISCONTINUED | OUTPATIENT
Start: 2021-06-03 | End: 2021-06-03

## 2021-06-03 RX ADMIN — Medication 400 MILLIGRAM(S): at 20:48

## 2021-06-03 RX ADMIN — Medication 325 MILLIGRAM(S): at 12:06

## 2021-06-03 RX ADMIN — Medication 400 MILLIGRAM(S): at 21:45

## 2021-06-03 RX ADMIN — Medication 15 MILLIGRAM(S): at 19:58

## 2021-06-03 RX ADMIN — Medication 15 MILLIGRAM(S): at 20:13

## 2021-06-03 RX ADMIN — Medication 1 TABLET(S): at 12:49

## 2021-06-03 RX ADMIN — Medication 15 MILLIGRAM(S): at 07:52

## 2021-06-03 RX ADMIN — Medication 250 MILLIGRAM(S): at 11:39

## 2021-06-03 RX ADMIN — Medication 1 TABLET(S): at 12:06

## 2021-06-03 RX ADMIN — SODIUM CHLORIDE 150 MILLILITER(S): 9 INJECTION INTRAMUSCULAR; INTRAVENOUS; SUBCUTANEOUS at 07:38

## 2021-06-03 RX ADMIN — Medication 1 TABLET(S): at 18:08

## 2021-06-03 RX ADMIN — OXYCODONE HYDROCHLORIDE 5 MILLIGRAM(S): 5 TABLET ORAL at 00:30

## 2021-06-03 RX ADMIN — Medication 15 MILLIGRAM(S): at 08:08

## 2021-06-03 RX ADMIN — Medication 1 TABLET(S): at 18:46

## 2021-06-03 NOTE — DISCHARGE NOTE PROVIDER - CARE PROVIDER_API CALL
[place] [id number]  Phone: [phone]  Fax: [phone]  Established Patient  Follow Up Time: 1 week

## 2021-06-03 NOTE — CONSULT NOTE ADULT - ATTENDING COMMENTS
32 year old presents with right upper leg pain.  Associated fever a.    recent surgery.    Evolving right leg cellulitis. Concern for evolving abscess.     If right leg, drains, please send wound culture    Concern for MRSA    Non-tuberculosis mycobacterium soft tissue infections have been reported after cosmetic procedures- but seems too early    Continue vancomycin    therapeutic drug monitoring of vancomycin  ----check through.  Goal is over 10

## 2021-06-03 NOTE — PROGRESS NOTE ADULT - PROBLEM SELECTOR PLAN 6
#DVT ppx - enoxaparin given recent sx and decreased mobility  #can consult plastics regarding need for compressive garments

## 2021-06-03 NOTE — PROGRESS NOTE ADULT - SUBJECTIVE AND OBJECTIVE BOX
PROGRESS NOTE:   Authored by Manuel Grossman MD, PGY1 [facility] Pager [phone] [facility] pager [phone]    Patient is a 36y old  Female who presents with a chief complaint of post-surgical infection (02 Jun 2021 21:42)      SUBJECTIVE / OVERNIGHT EVENTS:     REVIEW OF SYSTEMS:    CONSTITUTIONAL: No weakness, fevers or chills  EYES/ENT: No visual changes;  No vertigo or throat pain   NECK: No pain or stiffness  RESPIRATORY: No cough, wheezing, hemoptysis; No shortness of breath  CARDIOVASCULAR: No chest pain or palpitations  GASTROINTESTINAL: No abdominal or epigastric pain. No nausea, vomiting, or hematemesis; No diarrhea or constipation. No melena or hematochezia.  GENITOURINARY: No dysuria, frequency or hematuria  NEUROLOGICAL: No numbness or weakness  SKIN: No itching, rashes    MEDICATIONS  (STANDING):  enoxaparin Injectable 40 milliGRAM(s) SubCutaneous every 24 hours  sodium chloride 0.9%. 1000 milliLiter(s) (150 mL/Hr) IV Continuous <Continuous>  vancomycin  IVPB 1000 milliGRAM(s) IV Intermittent every 12 hours    MEDICATIONS  (PRN):  acetaminophen   Tablet .. 650 milliGRAM(s) Oral every 6 hours PRN Temp greater or equal to 38C (100.4F), Mild Pain (1 - 3)  ketorolac   Injectable 15 milliGRAM(s) IV Push every 8 hours PRN Moderate Pain (4 - 6)  oxyCODONE    IR 5 milliGRAM(s) Oral every 6 hours PRN Moderate to Severe Pain (4 - 10)  senna 2 Tablet(s) Oral at bedtime PRN Constipation      CAPILLARY BLOOD GLUCOSE        I&O's Summary      PHYSICAL EXAM:  Vital Signs Last 24 Hrs  T(C): 37.4 (03 Jun 2021 06:26), Max: 38.4 (02 Jun 2021 22:15)  T(F): 99.3 (03 Jun 2021 06:26), Max: 101.1 (02 Jun 2021 22:15)  HR: 95 (03 Jun 2021 06:26) (74 - 105)  BP: 114/68 (03 Jun 2021 06:26) (100/59 - 114/68)  BP(mean): --  RR: 19 (03 Jun 2021 06:26) (15 - 19)  SpO2: 100% (03 Jun 2021 06:26) (96% - 100%)    CONSTITUTIONAL: NAD, well-developed  RESPIRATORY: Normal respiratory effort; lungs are clear to auscultation bilaterally  CARDIOVASCULAR: Regular rate and rhythm, normal S1 and S2, no murmur/rub/gallop; No lower extremity edema; Peripheral pulses are 2+ bilaterally  ABDOMEN: Nontender to palpation, normoactive bowel sounds, no rebound/guarding; No hepatosplenomegaly  MUSCLOSKELETAL: no clubbing or cyanosis of digits; no joint swelling or tenderness to palpation  PSYCH: A+O to person, place, and time; affect appropriate  NEURO: Non-focal, no tremors  SKIN: No rashes    LABS:                        9.5    8.64  )-----------( 228      ( 02 Jun 2021 06:42 )             30.6     06-02    134<L>  |  102  |  15  ----------------------------<  127<H>  4.5   |  23  |  0.89    Ca    10.5      02 Jun 2021 06:42                  RADIOLOGY & ADDITIONAL TESTS:  No new imaging or tests    COORDINATION OF CARE:  Care Discussed with Consultants/Other Providers [Y/N]:  Prior or Outpatient Records Reviewed [Y/N]:   PROGRESS NOTE:   Authored by Manuel Grossman MD, PGY1 [facility] Pager [phone] [facility] pager [phone]    Patient is a 36y old  Female who presents with a chief complaint of post-surgical infection (02 Jun 2021 21:42)      SUBJECTIVE / OVERNIGHT EVENTS: No acute events. Pt feels overall "like crap." Main symptoms are headache, which she gets chronically daily and only typically relieved by excedrin, and back pain, which she relates to laying on her stomach. Hip hurts as well. Has some mild nausea, which she also relates to laying on her stomach, but no abdominal pain and no vomiting. No diarrhea/constipation. No chest pain or SOB. no numbness or tingling in extremities.     REVIEW OF SYSTEMS:    CONSTITUTIONAL: No weakness, fevers or chills.  EYES/ENT: No visual changes;  No vertigo or throat pain. +Headache   NECK: No pain or stiffness  RESPIRATORY: No cough, wheezing, hemoptysis; No shortness of breath  CARDIOVASCULAR: No chest pain or palpitations  GASTROINTESTINAL: No abdominal or epigastric pain. Mild nausea without vomiting or hematemesis; No diarrhea or constipation. No melena or hematochezia.  GENITOURINARY: No dysuria, frequency or hematuria  NEUROLOGICAL: No numbness or weakness  SKIN: No itching, rashes    MEDICATIONS  (STANDING):  enoxaparin Injectable 40 milliGRAM(s) SubCutaneous every 24 hours  sodium chloride 0.9%. 1000 milliLiter(s) (150 mL/Hr) IV Continuous <Continuous>  vancomycin  IVPB 1000 milliGRAM(s) IV Intermittent every 12 hours    MEDICATIONS  (PRN):  acetaminophen   Tablet .. 650 milliGRAM(s) Oral every 6 hours PRN Temp greater or equal to 38C (100.4F), Mild Pain (1 - 3)  ketorolac   Injectable 15 milliGRAM(s) IV Push every 8 hours PRN Moderate Pain (4 - 6)  oxyCODONE    IR 5 milliGRAM(s) Oral every 6 hours PRN Moderate to Severe Pain (4 - 10)  senna 2 Tablet(s) Oral at bedtime PRN Constipation      CAPILLARY BLOOD GLUCOSE        I&O's Summary      PHYSICAL EXAM:  Vital Signs Last 24 Hrs  T(C): 37.4 (03 Jun 2021 06:26), Max: 38.4 (02 Jun 2021 22:15)  T(F): 99.3 (03 Jun 2021 06:26), Max: 101.1 (02 Jun 2021 22:15)  HR: 95 (03 Jun 2021 06:26) (74 - 105)  BP: 114/68 (03 Jun 2021 06:26) (100/59 - 114/68)  BP(mean): --  RR: 19 (03 Jun 2021 06:26) (15 - 19)  SpO2: 100% (03 Jun 2021 06:26) (96% - 100%)    CONSTITUTIONAL:  well-developed, mildly uncomfortable appearing.  RESPIRATORY: Normal respiratory effort; lungs are clear to auscultation bilaterally  CARDIOVASCULAR: Regular rate and rhythm, normal S1 and S2, no murmur/rub/gallop; No lower extremity edema; Peripheral pulses are 2+ bilaterally  ABDOMEN: Nontender to palpation, normoactive bowel sounds, no rebound/guarding; No hepatosplenomegaly  MUSCLOSKELETAL: no clubbing or cyanosis of digits; no joint swelling or tenderness to palpation  PSYCH: A+O to person, place, and time; affect appropriate  NEURO: Non-focal, no tremors  SKIN: No rashes. R hip erythema TTP with pustules at center. Erythema mildly receded from outer line of demarcation.     LABS:                        9.5    8.64  )-----------( 228      ( 02 Jun 2021 06:42 )             30.6     06-02    134<L>  |  102  |  15  ----------------------------<  127<H>  4.5   |  23  |  0.89    Ca    10.5      02 Jun 2021 06:42                  RADIOLOGY & ADDITIONAL TESTS:  No new imaging or tests    COORDINATION OF CARE:  Care Discussed with Consultants/Other Providers [Y/N]:  Prior or Outpatient Records Reviewed [Y/N]:

## 2021-06-03 NOTE — PROGRESS NOTE ADULT - PROBLEM SELECTOR PLAN 4
unknown baseline, s/p recent sx  check iron/TIBC, ferritin with AM labs, monitor/trend unknown baseline, s/p recent sx  -Iron very low, will start iron tabs  -F/U B12/Folate  -monitor/trend

## 2021-06-03 NOTE — CONSULT NOTE ADULT - ASSESSMENT
Assessment:  The patient is a 36 year old female with no significant past medical history who presented from home with right hip erythema. She was initially seen in the emergency department at [facility] on 5/31 and sent home with PO cephalexin and trimethoprim/sulfamethoxazole. She returned due to worsening erythema on 6/01 and was admitted to the CDU. She had a fever of 100.9. She reports that she underwent a recent Brazilian butt lift with liposuction procedure on 5/21/21 in [place]. She was sent home on unknown oral antibiotics (took 3 pills a day for 7 days with oral analgesics. She notes she was supposed to be wearing compression garments and getting lymphatic drainage however was not been able to do so since arrival. She was admitted for post-surgical infection of the thigh and anterior abdominal wall and was started on intravenous antibiotics. Infectious disease was consulted for further recommendations.       Plan:  # Post-surgical infection  - Continue intravenous vancomycin for now  - Await final blood cultures x 2 with sensitivities  - COVID-19 Esa Ab pending  - Monitor fever curve  - Trend CBC and CMP daily  - ID will continue to follow    Case not yet discussed with attending      Eros Villegas MD PGY-4   Fellow, Infectious Diseases   Pager: [phone]  If no response, after 5pm and on weekends: Call [phone]   Assessment:  The patient is a 36 year old female with no significant past medical history who presented from home with right hip erythema. She was initially seen in the emergency department at [facility] on 5/31 and sent home with PO cephalexin and trimethoprim/sulfamethoxazole. She returned due to worsening erythema on 6/01 and was admitted to the CDU. She had a fever of 100.9. She reports that she underwent a recent Brazilian butt lift with liposuction procedure on 5/21/21 in [place]. She was sent home on unknown oral antibiotics (took 3 pills a day for 7 days with oral analgesics. She notes she was supposed to be wearing compression garments and getting lymphatic drainage however was not been able to do so since arrival. She was admitted for post-surgical infection of the thigh and anterior abdominal wall and was started on intravenous antibiotics. Infectious disease was consulted for further recommendations.       Plan:  # Post-surgical right thigh cellulitis   - Continue intravenous vancomycin for now  - Await final blood cultures x 2 with sensitivities  - Recommend plastic surgery evaluation   - COVID-19 Esa Ab pending  - Monitor fever curve  - Trend CBC and CMP daily  - ID will continue to follow    Case seen and discussed with MD Eros Boss MD PGY-4   Fellow, Infectious Diseases   Pager: [phone]  If no response, after 5pm and on weekends: Call [phone]

## 2021-06-03 NOTE — DISCHARGE NOTE PROVIDER - NSFOLLOWUPCLINICS_GEN_ALL_ED_FT
[facility] - Infectious Disease  Infectious Disease  [address], [place] [address]  Phone: [phone]  Fax:   Follow Up Time: 2 weeks

## 2021-06-03 NOTE — DISCHARGE NOTE PROVIDER - NSDCMRMEDTOKEN_GEN_ALL_CORE_FT
ferrous sulfate 325 mg (65 mg elemental iron) oral tablet: 1 tab(s) orally once a day   Bactrim  mg-160 mg oral tablet: 1 tab(s) orally 2 times a day   cefadroxil 500 mg oral capsule: 1 cap(s) orally 2 times a day   ferrous sulfate 325 mg (65 mg elemental iron) oral tablet: 1 tab(s) orally once a day

## 2021-06-03 NOTE — PROGRESS NOTE ADULT - PROBLEM SELECTOR PLAN 5
EKG pending for QT prior to further anti-emetics Rx, f/u #DVT ppx - enoxaparin given recent sx and decreased mobility  #Diet, regular

## 2021-06-03 NOTE — DISCHARGE NOTE PROVIDER - HOSPITAL COURSE
HPI:  36-year-old female with no significant past medical history, presented from home with R hip erythema. Patient was initially seen in the ED on 5/31 and sent home with PO abx (cephalexin and trimethoprim/sulfamethoxazole). She returned due to worsening erythema on 6/01 and was admitted to the CDU. She spiked a temp of 100.9 and was admitted to medicine. Patient notes recent Brazilian butt lift w/lipo procedure on 5/21/21 in [place]. She was sent home on unknown PO abx (took 3x/day x7 days, completed) and PO analgesics (also completed Rx). She notes she was supposed to be wearing compression garments and getting lymphatic drainage however has not been able to do so since arrival. She reports 1,000/10 holocranial headache (typical of her migraine headaches that she gets occasionally), with associated nausea, as well as fevers/chills, sweating, and neck and back pain described as an ache that she relates to the surgery as well as laying on her stomach (she reports being unable to sleep on her back).     In the CDU VS: 100.9    100-143/51-88  15-19  %RA, received trimethoprim/sulfamethoxazole 160/800mg PO x3, cefazolin 1g IVPB x6, acetaminophen 650mg PO x3 and 975mg PO x1, ketorolac 15mg IV x2, ondansetron 4mg IV x1, metoclopramide 10mg IV x1, oxycodone/acetaminophen 5/325mg PO x1, ordered for vancomycin 1g Q12  (02 Jun 2021 21:42)    Hospital course: Pt continued on vanc. ID followed during stay. Wound cx _______. Plastic surgery consulted as well. For migraines, patient treated with IV toradol and esgic. Pt. improved during hospital stay. Pt is stable and is clear for discharge. She should follow up with her PCP within one week for repeat labs and checkup. HPI:  36-year-old female with no significant past medical history, presented from home with R hip erythema. Patient was initially seen in the ED on 5/31 and sent home with PO abx (cephalexin and trimethoprim/sulfamethoxazole). She returned due to worsening erythema on 6/01 and was admitted to the CDU. She spiked a temp of 100.9 and was admitted to medicine. Patient notes recent Brazilian butt lift w/lipo procedure on 5/21/21 in [place]. She was sent home on unknown PO abx (took 3x/day x7 days, completed) and PO analgesics (also completed Rx). She notes she was supposed to be wearing compression garments and getting lymphatic drainage however has not been able to do so since arrival. She reports 1,000/10 holocranial headache (typical of her migraine headaches that she gets occasionally), with associated nausea, as well as fevers/chills, sweating, and neck and back pain described as an ache that she relates to the surgery as well as laying on her stomach (she reports being unable to sleep on her back).     In the CDU VS: 100.9    100-143/51-88  15-19  %RA, received trimethoprim/sulfamethoxazole 160/800mg PO x3, cefazolin 1g IVPB x6, acetaminophen 650mg PO x3 and 975mg PO x1, ketorolac 15mg IV x2, ondansetron 4mg IV x1, metoclopramide 10mg IV x1, oxycodone/acetaminophen 5/325mg PO x1, ordered for vancomycin 1g Q12  (02 Jun 2021 21:42)    Hospital course: Pt continued on vanc. ID followed during stay. Wound cx with no growth. Bcx negative. Plastic surgery consulted as well. For migraines, patient treated with motrin and esgic. Pt. improved during hospital stay. Pt is stable and is clear for discharge. She should follow up with her PCP within one week for repeat labs and checkup.

## 2021-06-03 NOTE — DISCHARGE NOTE PROVIDER - NSDCCPCAREPLAN_GEN_ALL_CORE_FT
PRINCIPAL DISCHARGE DIAGNOSIS  Diagnosis: Cellulitis  Assessment and Plan of Treatment: You came in with an infection in the superficial aspect of your thigh after your recent procedure. We treated you with IV antibiotics and had the plastic surgery team evaluate your thigh. You should continue taking your antibiotics as prescribed and follow up with your PCP within one week.      SECONDARY DISCHARGE DIAGNOSES  Diagnosis: Normocytic anemia  Assessment and Plan of Treatment: You were found to be anemic in the hospital, which can cause symptoms of fatigue, fast heart rate, and palpitations among other symptoms. This is most likely chronic and secondary to low iron. We sent iron tablets to your pharmacy, which you should take daily. Note that taking iron can cause constipation, so if you experience this side effect, consult your PCP who can recommend/prescribe medications to make you less constipated. You should follow up with your PCP for repeat labwork.     PRINCIPAL DISCHARGE DIAGNOSIS  Diagnosis: Cellulitis  Assessment and Plan of Treatment: You came in with an infection in the superficial aspect of your thigh after your recent procedure. We treated you with IV antibiotics and had the plastic surgery team evaluate your thigh. You should continue taking your antibiotics as prescribed and follow up with your PCP within one week.  Please start your first dose of both antibiotics tonight, and take both in the AM and PM until gone.      SECONDARY DISCHARGE DIAGNOSES  Diagnosis: Normocytic anemia  Assessment and Plan of Treatment: You were found to be anemic in the hospital, which can cause symptoms of fatigue, fast heart rate, and palpitations among other symptoms. This is most likely chronic and secondary to low iron. We sent iron tablets to your pharmacy, which you should take daily. Note that taking iron can cause constipation, so if you experience this side effect, consult your PCP who can recommend/prescribe medications to make you less constipated. You should follow up with your PCP for repeat labwork.

## 2021-06-03 NOTE — CONSULT NOTE ADULT - SUBJECTIVE AND OBJECTIVE BOX
Patient is a 36 year old female who presents with a chief complaint of thigh pain. (03 Jun 2021 07:05)    HPI:  The patient is a 36 year old female with no significant past medical history who presented from home with right hip erythema. She was initially seen in the emergency department at [facility] on 5/31 and sent home with PO cephalexin and trimethoprim/sulfamethoxazole. She returned due to worsening erythema on 6/01 and was admitted to the CDU. She had a fever of 100.9. She reports that she underwent a recent Brazilian butt lift with liposuction procedure on 5/21/21 in [place]. She was sent home on unknown oral antibiotics (took 3 pills a day for 7 days with oral analgesics. She notes she was supposed to be wearing compression garments and getting lymphatic drainage however was not been able to do so since arrival. She reports an associated holocranial headache (typical of her migraine headaches that she gets occasionally), with associated nausea, as well as fevers, chills, sweating, and neck and back pain described as an ache that she relates to the surgery as well as laying on her stomach (she reports being unable to sleep on her back).     CBC showed normocytic anemia otherwise unremarkable with neutrophilia. CMP showed hyperglycemia otherwise unremarkable. COVID-19 PCR was negative. CT abdomen and pelvis with IV contrast showed mild enlargement right inguinal and external iliac lymph nodes with non-loculated fluid and soft tissue infiltration in the subcutaneous fat of the anterior abdominal wall and both flanks with skin thickening overlying the right flank. No subcutaneous air or abscess was noted. She received intravenous cefazolin and started on intravenous vancomycin. Blood cultures x 2 and COVID-19 Esa Ab are pending. Infectious disease was consulted for further recommendations.       prior hospital charts reviewed [x]  primary team notes reviewed [x]  other consultant notes reviewed [x]    PAST MEDICAL & SURGICAL HISTORY:  Headache, migraine  S/P cosmetic plastic surgery  Breast augmentation (past); Brazilian butt lift and liposuction 5/21/21  History of appendectomy        Allergies  penicillin (Hives)    ANTIMICROBIALS (past 90 days)  MEDICATIONS  (STANDING):  ceFAZolin   IVPB   100 mL/Hr IV Intermittent (06-01-21 @ 01:17)    ceFAZolin   IVPB   100 mL/Hr IV Intermittent (06-02-21 @ 16:08)   100 mL/Hr IV Intermittent (06-02-21 @ 07:45)   100 mL/Hr IV Intermittent (06-02-21 @ 00:18)   100 mL/Hr IV Intermittent (06-01-21 @ 15:57)   100 mL/Hr IV Intermittent (06-01-21 @ 09:05)    trimethoprim  160 mG/sulfamethoxazole 800 mG   1 Tablet(s) Oral (06-02-21 @ 05:45)   1 Tablet(s) Oral (06-01-21 @ 17:46)   1 Tablet(s) Oral (06-01-21 @ 07:33)    vancomycin  IVPB   250 mL/Hr IV Intermittent (06-02-21 @ 23:36)        vancomycin  IVPB 1000 every 12 hours    OTHER MEDS: MEDICATIONS  (STANDING):  acetaminophen   Tablet .. 650 every 6 hours PRN  enoxaparin Injectable 40 every 24 hours  ketorolac   Injectable 15 every 8 hours PRN  senna 2 at bedtime PRN    SOCIAL HISTORY:  Denies smoking, alcohol, or recreational drug use     FAMILY HISTORY:  Family history of diabetes mellitus (DM) (Father, Mother)  Family history of ovarian cancer (Mother)      REVIEW OF SYSTEMS  [  ] ROS unobtainable because:    [x] All other systems negative except as noted below:	    Constitutional:  [x] fever [ ] chills  [ ] weight loss  [ ] weakness  Skin:  [x] rash [ ] phlebitis	  Eyes: [ ] icterus [ ] pain  [ ] discharge	  ENMT: [ ] sore throat  [ ] thrush [ ] ulcers [ ] exudates  Respiratory: [ ] dyspnea [ ] hemoptysis [ ] cough [ ] sputum	  Cardiovascular:  [ ] chest pain [ ] palpitations [ ] edema	  Gastrointestinal:  [ ] nausea [ ] vomiting [ ] diarrhea [ ] constipation [ ] pain	  Genitourinary:  [ ] dysuria [ ] frequency [ ] hematuria [ ] discharge [ ] flank pain  [ ] incontinence  Musculoskeletal:  [ ] myalgias [ ] arthralgias [ ] arthritis  [ ] back pain  Neurological:  [ ] headache [ ] seizures  [ ] confusion/altered mental status  Psychiatric:  [ ] anxiety [ ] depression	  Hematology/Lymphatics:  [ ] lymphadenopathy  Endocrine:  [ ] adrenal [ ] thyroid  Allergic/Immunologic:	 [ ] transplant [ ] seasonal    Vital Signs Last 24 Hrs  T(F): 99.3 (06-03-21 @ 06:26), Max: 101.1 (06-02-21 @ 22:15)  Vital Signs Last 24 Hrs  HR: 95 (06-03-21 @ 06:26) (74 - 105)  BP: 114/68 (06-03-21 @ 06:26) (100/59 - 114/68)  RR: 19 (06-03-21 @ 06:26)  SpO2: 100% (06-03-21 @ 06:26) (96% - 100%)  Wt(kg): --    PHYSICAL EXAM:  Constitutional: non-toxic, no distress  HEAD/EYES: anicteric, no conjunctival injection  ENT:  supple, no thrush  Cardiovascular:   normal S1, S2, no murmur, no edema  Respiratory:  clear BS bilaterally, no wheezes, no rales  GI:  soft, non-tender, normal bowel sounds  :  no lindsey, no CVA tenderness  Musculoskeletal:  no synovitis, normal ROM  Neurologic: awake and alert, normal strength, no focal findings  Skin:  no rash, no erythema, no phlebitis  Heme/Onc: no lymphadenopathy   Psychiatric:  awake, alert, appropriate mood                            8.6    7.85  )-----------( 226      ( 03 Jun 2021 08:04 )             28.0   06-03    135  |  104  |  8   ----------------------------<  129<H>  4.2   |  20<L>  |  0.59    Ca    9.9      03 Jun 2021 08:04  Phos  1.6     06-03  Mg     1.9     06-03    TPro  6.2  /  Alb  3.4  /  TBili  0.4  /  DBili  x   /  AST  12  /  ALT  20  /  AlkPhos  54  06-03    MICROBIOLOGY:    Blood cultures x 2 in process    COVID-19 Esa Ab in process        RADIOLOGY:    < from: CT Abdomen and Pelvis w/ IV Cont (06.02.21 @ 13:57) >  FINDINGS:  LOWER CHEST: Breast implants.    LIVER: Within normal limits.  BILE DUCTS: Normal caliber.  GALLBLADDER: Within normal limits.  SPLEEN: Within normal limits.  PANCREAS: Within normal limits.  ADRENALS: Within normal limits.  KIDNEYS/URETERS: Within normal limits.    BLADDER: Nondistended.  REPRODUCTIVE ORGANS: Uterus and adnexa appear unremarkable.    BOWEL: No bowel obstruction. Appendix surgically absent.  PERITONEUM: No ascites.  VESSELS: Within normal limits.  RETROPERITONEUM/LYMPH NODES: Mildenlargement right inguinal and external iliac lymph nodes.  ABDOMINAL WALL: Nonloculated fluid and soft tissue infiltration in the subcutaneous fat of the anterior abdominal wall and both flanks. Skin thickening overlying the right flank. No subcutaneous air. No abscess.  BONES: Within normal limits.    IMPRESSION:  Postsurgical changes in the abdominal wall and both flanks. No CT evidence of abscess or gas forming infection.    < end of copied text >         Patient is a 36 year old female who presents with a chief complaint of thigh pain. (03 Jun 2021 07:05)    HPI:  The patient is a 36 year old female with no significant past medical history who presented from home with right hip erythema. She was initially seen in the emergency department at [facility] on 5/31 and sent home with PO cephalexin and trimethoprim/sulfamethoxazole. She returned due to worsening erythema on 6/01 and was admitted to the CDU. She had a fever of 100.9. She reports that she underwent a recent Brazilian butt lift with liposuction procedure on 5/21/21 in [place]. She was sent home on unknown oral antibiotics (took 3 pills a day for 7 days with oral analgesics. She notes she was supposed to be wearing compression garments and getting lymphatic drainage however was not been able to do so since arrival. She reports an associated holocranial headache (typical of her migraine headaches that she gets occasionally), with associated nausea, as well as fevers, chills, sweating, and neck and back pain described as an ache that she relates to the surgery as well as laying on her stomach (she reports being unable to sleep on her back).     CBC showed normocytic anemia otherwise unremarkable with neutrophilia. CMP showed hyperglycemia otherwise unremarkable. COVID-19 PCR was negative. CT abdomen and pelvis with IV contrast showed mild enlargement right inguinal and external iliac lymph nodes with non-loculated fluid and soft tissue infiltration in the subcutaneous fat of the anterior abdominal wall and both flanks with skin thickening overlying the right flank. No subcutaneous air or abscess was noted. She received intravenous cefazolin and started on intravenous vancomycin. Blood cultures x 2 and COVID-19 Esa Ab are pending. Infectious disease was consulted for further recommendations.       prior hospital charts reviewed [x]  primary team notes reviewed [x]  other consultant notes reviewed [x]    PAST MEDICAL & SURGICAL HISTORY:  Headache, migraine  S/P cosmetic plastic surgery  Breast augmentation (past); Brazilian butt lift and liposuction 5/21/21  History of appendectomy        Allergies  penicillin (Hives)    ANTIMICROBIALS (past 90 days)  MEDICATIONS  (STANDING):  ceFAZolin   IVPB   100 mL/Hr IV Intermittent (06-01-21 @ 01:17)    ceFAZolin   IVPB   100 mL/Hr IV Intermittent (06-02-21 @ 16:08)   100 mL/Hr IV Intermittent (06-02-21 @ 07:45)   100 mL/Hr IV Intermittent (06-02-21 @ 00:18)   100 mL/Hr IV Intermittent (06-01-21 @ 15:57)   100 mL/Hr IV Intermittent (06-01-21 @ 09:05)    trimethoprim  160 mG/sulfamethoxazole 800 mG   1 Tablet(s) Oral (06-02-21 @ 05:45)   1 Tablet(s) Oral (06-01-21 @ 17:46)   1 Tablet(s) Oral (06-01-21 @ 07:33)    vancomycin  IVPB   250 mL/Hr IV Intermittent (06-02-21 @ 23:36)        vancomycin  IVPB 1000 every 12 hours    OTHER MEDS: MEDICATIONS  (STANDING):  acetaminophen   Tablet .. 650 every 6 hours PRN  enoxaparin Injectable 40 every 24 hours  ketorolac   Injectable 15 every 8 hours PRN  senna 2 at bedtime PRN    SOCIAL HISTORY:  Denies smoking, alcohol, or recreational drug use     FAMILY HISTORY:  Family history of diabetes mellitus (DM) (Father, Mother)  Family history of ovarian cancer (Mother)      REVIEW OF SYSTEMS  [  ] ROS unobtainable because:    [x] All other systems negative except as noted below:	    Constitutional:  [x] fever [ ] chills  [ ] weight loss  [ ] weakness  Skin:  [x] rash [ ] phlebitis	  Eyes: [ ] icterus [ ] pain  [ ] discharge	  ENMT: [ ] sore throat  [ ] thrush [ ] ulcers [ ] exudates  Respiratory: [ ] dyspnea [ ] hemoptysis [ ] cough [ ] sputum	  Cardiovascular:  [ ] chest pain [ ] palpitations [ ] edema	  Gastrointestinal:  [ ] nausea [ ] vomiting [ ] diarrhea [ ] constipation [ ] pain	  Genitourinary:  [ ] dysuria [ ] frequency [ ] hematuria [ ] discharge [ ] flank pain  [ ] incontinence  Musculoskeletal:  [ ] myalgias [ ] arthralgias [ ] arthritis  [ ] back pain [x] right thigh pain  Neurological:  [ ] headache [ ] seizures  [ ] confusion/altered mental status  Psychiatric:  [ ] anxiety [ ] depression	  Hematology/Lymphatics:  [ ] lymphadenopathy  Endocrine:  [ ] adrenal [ ] thyroid  Allergic/Immunologic:	 [ ] transplant [ ] seasonal    Vital Signs Last 24 Hrs  T(F): 99.3 (06-03-21 @ 06:26), Max: 101.1 (06-02-21 @ 22:15)  Vital Signs Last 24 Hrs  HR: 95 (06-03-21 @ 06:26) (74 - 105)  BP: 114/68 (06-03-21 @ 06:26) (100/59 - 114/68)  RR: 19 (06-03-21 @ 06:26)  SpO2: 100% (06-03-21 @ 06:26) (96% - 100%)  Wt(kg): --    PHYSICAL EXAM:  Constitutional: non-toxic, no distress  HEAD/EYES: anicteric, no conjunctival injection  ENT:  supple, no thrush  Cardiovascular:   normal S1, S2, no murmur, no edema  Respiratory:  clear BS bilaterally, no wheezes, no rales  GI:  soft, non-tender, normal bowel sounds  :  no lindsey, no CVA tenderness  Musculoskeletal:  no synovitis, normal ROM  Neurologic: awake and alert, normal strength, no focal findings  Skin:  + right thigh surgical site wound erythema, edema, induration, with purulence   Heme/Onc: no lymphadenopathy   Psychiatric:  awake, alert, appropriate mood                            8.6    7.85  )-----------( 226      ( 03 Jun 2021 08:04 )             28.0   06-03    135  |  104  |  8   ----------------------------<  129<H>  4.2   |  20<L>  |  0.59    Ca    9.9      03 Jun 2021 08:04  Phos  1.6     06-03  Mg     1.9     06-03    TPro  6.2  /  Alb  3.4  /  TBili  0.4  /  DBili  x   /  AST  12  /  ALT  20  /  AlkPhos  54  06-03    MICROBIOLOGY:    Blood cultures x 2 in process    COVID-19 Esa Ab in process        RADIOLOGY:    < from: CT Abdomen and Pelvis w/ IV Cont (06.02.21 @ 13:57) >  FINDINGS:  LOWER CHEST: Breast implants.    LIVER: Within normal limits.  BILE DUCTS: Normal caliber.  GALLBLADDER: Within normal limits.  SPLEEN: Within normal limits.  PANCREAS: Within normal limits.  ADRENALS: Within normal limits.  KIDNEYS/URETERS: Within normal limits.    BLADDER: Nondistended.  REPRODUCTIVE ORGANS: Uterus and adnexa appear unremarkable.    BOWEL: No bowel obstruction. Appendix surgically absent.  PERITONEUM: No ascites.  VESSELS: Within normal limits.  RETROPERITONEUM/LYMPH NODES: Mildenlargement right inguinal and external iliac lymph nodes.  ABDOMINAL WALL: Nonloculated fluid and soft tissue infiltration in the subcutaneous fat of the anterior abdominal wall and both flanks. Skin thickening overlying the right flank. No subcutaneous air. No abscess.  BONES: Within normal limits.    IMPRESSION:  Postsurgical changes in the abdominal wall and both flanks. No CT evidence of abscess or gas forming infection.    < end of copied text >

## 2021-06-03 NOTE — PROGRESS NOTE ADULT - ASSESSMENT
36-year-old female presenting with sepsis secondary to post-op infection Ms Shrestha is a 36-year-old female presenting with sepsis secondary to post-op infection

## 2021-06-04 LAB
ANION GAP SERPL CALC-SCNC: 6 MMOL/L — LOW (ref 7–14)
BUN SERPL-MCNC: 13 MG/DL — SIGNIFICANT CHANGE UP (ref 7–23)
CALCIUM SERPL-MCNC: 10.6 MG/DL — HIGH (ref 8.4–10.5)
CHLORIDE SERPL-SCNC: 106 MMOL/L — SIGNIFICANT CHANGE UP (ref 98–107)
CO2 SERPL-SCNC: 24 MMOL/L — SIGNIFICANT CHANGE UP (ref 22–31)
CREAT SERPL-MCNC: 0.51 MG/DL — SIGNIFICANT CHANGE UP (ref 0.5–1.3)
GLUCOSE SERPL-MCNC: 95 MG/DL — SIGNIFICANT CHANGE UP (ref 70–99)
HCT VFR BLD CALC: 30.4 % — LOW (ref 34.5–45)
HGB BLD-MCNC: 9 G/DL — LOW (ref 11.5–15.5)
MAGNESIUM SERPL-MCNC: 1.9 MG/DL — SIGNIFICANT CHANGE UP (ref 1.6–2.6)
MCHC RBC-ENTMCNC: 25.9 PG — LOW (ref 27–34)
MCHC RBC-ENTMCNC: 29.6 GM/DL — LOW (ref 32–36)
MCV RBC AUTO: 87.6 FL — SIGNIFICANT CHANGE UP (ref 80–100)
NRBC # BLD: 0 /100 WBCS — SIGNIFICANT CHANGE UP
NRBC # FLD: 0 K/UL — SIGNIFICANT CHANGE UP
PHOSPHATE SERPL-MCNC: 2.3 MG/DL — LOW (ref 2.5–4.5)
PLATELET # BLD AUTO: 278 K/UL — SIGNIFICANT CHANGE UP (ref 150–400)
POTASSIUM SERPL-MCNC: 4.4 MMOL/L — SIGNIFICANT CHANGE UP (ref 3.5–5.3)
POTASSIUM SERPL-SCNC: 4.4 MMOL/L — SIGNIFICANT CHANGE UP (ref 3.5–5.3)
RBC # BLD: 3.47 M/UL — LOW (ref 3.8–5.2)
RBC # FLD: 13.7 % — SIGNIFICANT CHANGE UP (ref 10.3–14.5)
SODIUM SERPL-SCNC: 136 MMOL/L — SIGNIFICANT CHANGE UP (ref 135–145)
VANCOMYCIN TROUGH SERPL-MCNC: 5.5 UG/ML — LOW (ref 10–20)
WBC # BLD: 4.53 K/UL — SIGNIFICANT CHANGE UP (ref 3.8–10.5)
WBC # FLD AUTO: 4.53 K/UL — SIGNIFICANT CHANGE UP (ref 3.8–10.5)

## 2021-06-04 PROCEDURE — 99232 SBSQ HOSP IP/OBS MODERATE 35: CPT

## 2021-06-04 PROCEDURE — 99233 SBSQ HOSP IP/OBS HIGH 50: CPT | Mod: GC

## 2021-06-04 RX ORDER — LANOLIN ALCOHOL/MO/W.PET/CERES
3 CREAM (GRAM) TOPICAL ONCE
Refills: 0 | Status: COMPLETED | OUTPATIENT
Start: 2021-06-04 | End: 2021-06-04

## 2021-06-04 RX ORDER — IBUPROFEN 200 MG
600 TABLET ORAL EVERY 8 HOURS
Refills: 0 | Status: DISCONTINUED | OUTPATIENT
Start: 2021-06-04 | End: 2021-06-06

## 2021-06-04 RX ORDER — VANCOMYCIN HCL 1 G
1250 VIAL (EA) INTRAVENOUS EVERY 12 HOURS
Refills: 0 | Status: DISCONTINUED | OUTPATIENT
Start: 2021-06-04 | End: 2021-06-06

## 2021-06-04 RX ADMIN — Medication 650 MILLIGRAM(S): at 08:13

## 2021-06-04 RX ADMIN — Medication 325 MILLIGRAM(S): at 12:49

## 2021-06-04 RX ADMIN — Medication 650 MILLIGRAM(S): at 09:15

## 2021-06-04 RX ADMIN — Medication 1 TABLET(S): at 13:49

## 2021-06-04 RX ADMIN — Medication 250 MILLIGRAM(S): at 00:13

## 2021-06-04 RX ADMIN — Medication 166.67 MILLIGRAM(S): at 17:08

## 2021-06-04 RX ADMIN — Medication 3 MILLIGRAM(S): at 01:19

## 2021-06-04 RX ADMIN — Medication 600 MILLIGRAM(S): at 09:34

## 2021-06-04 RX ADMIN — Medication 600 MILLIGRAM(S): at 10:35

## 2021-06-04 RX ADMIN — Medication 1 TABLET(S): at 12:49

## 2021-06-04 NOTE — DIETITIAN INITIAL EVALUATION ADULT. - OTHER INFO
Dosing weight (6/2) 187.8 lbs, no weight history available via chart.  Denies current GI distress, last BM 6/3 per flowsheets, +bowel regimen (senna PRN).

## 2021-06-04 NOTE — PROGRESS NOTE ADULT - SUBJECTIVE AND OBJECTIVE BOX
PROGRESS NOTE:   Authored by Manuel Grossman MD, PGY1 [facility] Pager [phone] [facility] pager [phone]    Patient is a 36y old  Female who presents with a chief complaint of post-surgical infection (03 Jun 2021 14:38)      SUBJECTIVE / OVERNIGHT EVENTS:     REVIEW OF SYSTEMS:    CONSTITUTIONAL: No weakness, fevers or chills  EYES/ENT: No visual changes;  No vertigo or throat pain   NECK: No pain or stiffness  RESPIRATORY: No cough, wheezing, hemoptysis; No shortness of breath  CARDIOVASCULAR: No chest pain or palpitations  GASTROINTESTINAL: No abdominal or epigastric pain. No nausea, vomiting, or hematemesis; No diarrhea or constipation. No melena or hematochezia.  GENITOURINARY: No dysuria, frequency or hematuria  NEUROLOGICAL: No numbness or weakness  SKIN: No itching, rashes    MEDICATIONS  (STANDING):  enoxaparin Injectable 40 milliGRAM(s) SubCutaneous every 24 hours  ferrous    sulfate 325 milliGRAM(s) Oral daily  vancomycin  IVPB 1000 milliGRAM(s) IV Intermittent every 12 hours    MEDICATIONS  (PRN):  acetaminophen   Tablet .. 650 milliGRAM(s) Oral every 6 hours PRN Temp greater or equal to 38C (100.4F), Mild Pain (1 - 3)  acetaminophen 325 mG/butalbital 50 mG/caffeine 40 mG 1 Tablet(s) Oral every 6 hours PRN migraine headache  ketorolac   Injectable 15 milliGRAM(s) IV Push every 8 hours PRN Moderate Pain (4 - 6)  senna 2 Tablet(s) Oral at bedtime PRN Constipation      CAPILLARY BLOOD GLUCOSE        I&O's Summary      PHYSICAL EXAM:  Vital Signs Last 24 Hrs  T(C): 36.7 (04 Jun 2021 06:45), Max: 36.7 (03 Jun 2021 13:26)  T(F): 98.1 (04 Jun 2021 06:45), Max: 98.1 (03 Jun 2021 13:26)  HR: 94 (04 Jun 2021 06:45) (94 - 100)  BP: 110/62 (04 Jun 2021 06:45) (110/62 - 118/73)  BP(mean): --  RR: 17 (04 Jun 2021 06:45) (17 - 18)  SpO2: 100% (04 Jun 2021 06:45) (100% - 100%)    CONSTITUTIONAL: NAD, well-developed  RESPIRATORY: Normal respiratory effort; lungs are clear to auscultation bilaterally  CARDIOVASCULAR: Regular rate and rhythm, normal S1 and S2, no murmur/rub/gallop; No lower extremity edema; Peripheral pulses are 2+ bilaterally  ABDOMEN: Nontender to palpation, normoactive bowel sounds, no rebound/guarding; No hepatosplenomegaly  MUSCLOSKELETAL: no clubbing or cyanosis of digits; no joint swelling or tenderness to palpation  PSYCH: A+O to person, place, and time; affect appropriate  NEURO: Non-focal, no tremors  SKIN: No rashes    LABS:                        8.6    7.85  )-----------( 226      ( 03 Jun 2021 08:04 )             28.0     06-03    135  |  104  |  8   ----------------------------<  129<H>  4.2   |  20<L>  |  0.59    Ca    9.9      03 Jun 2021 08:04  Phos  1.6     06-03  Mg     1.9     06-03    TPro  6.2  /  Alb  3.4  /  TBili  0.4  /  DBili  x   /  AST  12  /  ALT  20  /  AlkPhos  54  06-03              Culture - Blood (collected 02 Jun 2021 12:36)  Source: .Blood Blood-Venous  Preliminary Report (03 Jun 2021 13:02):    No growth to date.    Culture - Blood (collected 02 Jun 2021 12:36)  Source: .Blood Blood-Peripheral  Preliminary Report (03 Jun 2021 13:02):    No growth to date.        RADIOLOGY & ADDITIONAL TESTS:  No new imaging or tests    COORDINATION OF CARE:  Care Discussed with Consultants/Other Providers [Y/N]:  Prior or Outpatient Records Reviewed [Y/N]:   PROGRESS NOTE:   Authored by Manuel Grossman MD, PGY1 [facility] Pager [phone] [facility] pager [phone]    Patient is a 36y old  Female who presents with a chief complaint of post-surgical infection (03 Jun 2021 14:38)      SUBJECTIVE / OVERNIGHT EVENTS: No acute events overnight. Pt feels a bit better today but just has generally achy. Still has headache, notes motrin is working the best currently. Pain in leg is ok. No numbness/tingling extremities.     REVIEW OF SYSTEMS:    CONSTITUTIONAL: No weakness, fevers or chills. +Headaches  EYES/ENT: No visual changes;  No vertigo or throat pain   NECK: No pain or stiffness  RESPIRATORY: No cough, wheezing, hemoptysis; No shortness of breath  CARDIOVASCULAR: No chest pain or palpitations  GASTROINTESTINAL: No abdominal or epigastric pain. No nausea, vomiting, or hematemesis; No diarrhea or constipation. No melena or hematochezia.  GENITOURINARY: No dysuria, frequency or hematuria  NEUROLOGICAL: No numbness or weakness  SKIN: No itching, rashes    MEDICATIONS  (STANDING):  enoxaparin Injectable 40 milliGRAM(s) SubCutaneous every 24 hours  ferrous    sulfate 325 milliGRAM(s) Oral daily  vancomycin  IVPB 1000 milliGRAM(s) IV Intermittent every 12 hours    MEDICATIONS  (PRN):  acetaminophen   Tablet .. 650 milliGRAM(s) Oral every 6 hours PRN Temp greater or equal to 38C (100.4F), Mild Pain (1 - 3)  acetaminophen 325 mG/butalbital 50 mG/caffeine 40 mG 1 Tablet(s) Oral every 6 hours PRN migraine headache  ketorolac   Injectable 15 milliGRAM(s) IV Push every 8 hours PRN Moderate Pain (4 - 6)  senna 2 Tablet(s) Oral at bedtime PRN Constipation      CAPILLARY BLOOD GLUCOSE        I&O's Summary      PHYSICAL EXAM:  Vital Signs Last 24 Hrs  T(C): 36.7 (04 Jun 2021 06:45), Max: 36.7 (03 Jun 2021 13:26)  T(F): 98.1 (04 Jun 2021 06:45), Max: 98.1 (03 Jun 2021 13:26)  HR: 94 (04 Jun 2021 06:45) (94 - 100)  BP: 110/62 (04 Jun 2021 06:45) (110/62 - 118/73)  BP(mean): --  RR: 17 (04 Jun 2021 06:45) (17 - 18)  SpO2: 100% (04 Jun 2021 06:45) (100% - 100%)    CONSTITUTIONAL: NAD, well-developed  RESPIRATORY: Normal respiratory effort; lungs are clear to auscultation bilaterally  CARDIOVASCULAR: Regular rate and rhythm, normal S1 and S2, no murmur/rub/gallop; No lower extremity edema; Peripheral pulses are 2+ bilaterally  ABDOMEN: Nontender to palpation, normoactive bowel sounds, no rebound/guarding; No hepatosplenomegaly  MUSCLOSKELETAL: no clubbing or cyanosis of digits; no joint swelling or tenderness to palpation  PSYCH: A+O to person, place, and time; affect appropriate  NEURO: Non-focal, no tremors  SKIN: R outer thigh cellulitis less erythematous and less TTP today. Wound draining purulent at center.     LABS:                        8.6    7.85  )-----------( 226      ( 03 Jun 2021 08:04 )             28.0     06-03    135  |  104  |  8   ----------------------------<  129<H>  4.2   |  20<L>  |  0.59    Ca    9.9      03 Jun 2021 08:04  Phos  1.6     06-03  Mg     1.9     06-03    TPro  6.2  /  Alb  3.4  /  TBili  0.4  /  DBili  x   /  AST  12  /  ALT  20  /  AlkPhos  54  06-03              Culture - Blood (collected 02 Jun 2021 12:36)  Source: .Blood Blood-Venous  Preliminary Report (03 Jun 2021 13:02):    No growth to date.    Culture - Blood (collected 02 Jun 2021 12:36)  Source: .Blood Blood-Peripheral  Preliminary Report (03 Jun 2021 13:02):    No growth to date.        RADIOLOGY & ADDITIONAL TESTS:  No new imaging or tests    COORDINATION OF CARE:  Care Discussed with Consultants/Other Providers [Y/N]:  Prior or Outpatient Records Reviewed [Y/N]:

## 2021-06-04 NOTE — PROGRESS NOTE ADULT - ASSESSMENT
Assessment:  The patient is a 36 year old female with no significant past medical history who presented from home with right hip erythema. She was initially seen in the emergency department at [facility] on 5/31 and sent home with PO cephalexin and trimethoprim/sulfamethoxazole. She returned due to worsening erythema on 6/01 and was admitted to the CDU. She had a fever of 100.9. She reports that she underwent a recent Brazilian butt lift with liposuction procedure on 5/21/21 in [place]. She was sent home on unknown oral antibiotics (took 3 pills a day for 7 days with oral analgesics. She notes she was supposed to be wearing compression garments and getting lymphatic drainage however was not been able to do so since arrival. She was admitted for post-surgical infection of the thigh and anterior abdominal wall and was started on intravenous antibiotics. Infectious disease was consulted for further recommendations.       Plan:  # Post-surgical right thigh cellulitis   - Continue intravenous vancomycin  - Blood cultures x 2 NGTD so far   - Recommend plastic surgery evaluation   - COVID-19 Esa Ab positive consistent with prior exposure   - Monitor fever curve  - Trend CBC and CMP daily  - ID will continue to follow      Erso Villegas MD PGY-4   Fellow, Infectious Diseases   Pager: [phone]  If no response, after 5pm and on weekends: Call [phone]   Assessment:  The patient is a 36 year old female with no significant past medical history who presented from home with right hip erythema. She was initially seen in the emergency department at [facility] on 5/31 and sent home with PO cephalexin and trimethoprim/sulfamethoxazole. She returned due to worsening erythema on 6/01 and was admitted to the CDU. She had a fever of 100.9. She reports that she underwent a recent Brazilian butt lift with liposuction procedure on 5/21/21 in [place]. She was sent home on unknown oral antibiotics (took 3 pills a day for 7 days with oral analgesics. She notes she was supposed to be wearing compression garments and getting lymphatic drainage however was not been able to do so since arrival. She was admitted for post-surgical infection of the thigh and anterior abdominal wall and was started on intravenous antibiotics. Infectious disease was consulted for further recommendations.       Plan:  # Post-surgical right thigh cellulitis   - Continue intravenous vancomycin  - Blood cultures x 2 NGTD so far   - Wound culture   - Recommend plastic surgery evaluation   - COVID-19 Esa Ab positive consistent with prior exposure   - Monitor fever curve  - Trend CBC and CMP daily  - ID will continue to follow      Eros Villegas MD PGY-4   Fellow, Infectious Diseases   Pager: [phone]  If no response, after 5pm and on weekends: Call [phone]

## 2021-06-04 NOTE — DIETITIAN INITIAL EVALUATION ADULT. - ADD RECOMMEND
2) Discussed alternative menu items, obtained food preferences and will provide additional tray items as able.                                                                                                                 3) Monitor PO intake, weight trends, nutrition related lab values, BMs/GI distress, hydration status, skin integrity.

## 2021-06-04 NOTE — DIETITIAN INITIAL EVALUATION ADULT. - ORAL INTAKE PTA/DIET HISTORY
Confirms NKFA. No noted micronutrient supplementation PTA per H&P.  Recent brazilian butt life with lipo (5/21/21).

## 2021-06-04 NOTE — PROGRESS NOTE ADULT - PROBLEM SELECTOR PLAN 3
Likely 2/2 pt hx migraines  acetaminophen, toradol, esgic PRN Likely 2/2 pt hx migraines  Motrin, esgic PRN

## 2021-06-04 NOTE — DIETITIAN INITIAL EVALUATION ADULT. - ENERGY INTAKE
Reports fair PO intake in house, dislikes institutional menu items, endorses decreased appetite.                                                                              Encouraged self completion of menu with personal preferences. Pt amenable to additional tray items, states she will accept "anything healthy".

## 2021-06-04 NOTE — PROGRESS NOTE ADULT - SUBJECTIVE AND OBJECTIVE BOX
Follow Up: Patient is a 36y old  Female who presents with a chief complaint of Per chart, pt is 36 year old female presenting with sepsis secondary to post-op infection. (04 Jun 2021 12:38)      Interval History/ROS:  Patient reports improved pain in the right thigh, denies any fever or overnight events. She reports headaches today.       Allergies    penicillin (Hives)    Intolerances        ANTIMICROBIALS:  vancomycin  IVPB 1250 every 12 hours      OTHER MEDS:  acetaminophen   Tablet .. 650 milliGRAM(s) Oral every 6 hours PRN  acetaminophen 325 mG/butalbital 50 mG/caffeine 40 mG 1 Tablet(s) Oral every 6 hours PRN  enoxaparin Injectable 40 milliGRAM(s) SubCutaneous every 24 hours  ferrous    sulfate 325 milliGRAM(s) Oral daily  ibuprofen  Tablet. 600 milliGRAM(s) Oral every 8 hours PRN  senna 2 Tablet(s) Oral at bedtime PRN      Vital Signs Last 24 Hrs  T(C): 36.7 (04 Jun 2021 06:45), Max: 36.7 (03 Jun 2021 13:26)  T(F): 98.1 (04 Jun 2021 06:45), Max: 98.1 (03 Jun 2021 13:26)  HR: 94 (04 Jun 2021 06:45) (94 - 100)  BP: 110/62 (04 Jun 2021 06:45) (110/62 - 118/73)  BP(mean): --  RR: 17 (04 Jun 2021 06:45) (17 - 18)  SpO2: 100% (04 Jun 2021 06:45) (100% - 100%)    REVIEW OF SYSTEMS  [  ] ROS unobtainable because:   [x] All other systems negative except as noted below:	    Constitutional:  [ ] fever [ ] chills  [ ] weight loss  [ ] weakness  Skin:  [x] rash [ ] phlebitis	  Eyes: [ ] icterus [ ] pain  [ ] discharge	  ENMT: [ ] sore throat  [ ] thrush [ ] ulcers [ ] exudates  Respiratory: [ ] dyspnea [ ] hemoptysis [ ] cough [ ] sputum	  Cardiovascular:  [ ] chest pain [ ] palpitations [ ] edema	  Gastrointestinal:  [ ] nausea [ ] vomiting [ ] diarrhea [ ] constipation [ ] pain	  Genitourinary:  [ ] dysuria [ ] frequency [ ] hematuria [ ] discharge [ ] flank pain  [ ] incontinence  Musculoskeletal:  [ ] myalgias [ ] arthralgias [ ] arthritis  [ ] back pain  Neurological:  [ ] headache [ ] seizures  [ ] confusion/altered mental status  Psychiatric:  [ ] anxiety [ ] depression	  Endocrine:  [ ] adrenal [ ] thyroid  Allergic/Immunologic:	 [ ] transplant [ ] seasonal    PHYSICAL EXAM:  Constitutional: non-toxic, no distress  HEAD/EYES: anicteric, no conjunctival injection  ENT:  supple, no thrush  Cardiovascular:   normal S1, S2, no murmur, no edema  Respiratory:  clear BS bilaterally, no wheezes, no rales  GI:  soft, non-tender, normal bowel sounds  :  no lindsey, no CVA tenderness  Musculoskeletal:  no synovitis, normal ROM  Neurologic: awake and alert, normal strength, no focal findings  Skin:  + right thigh surgical site wound erythema, edema, induration, with purulence   Heme/Onc: no lymphadenopathy   Psychiatric:  awake, alert, appropriate mood                        9.0    4.53  )-----------( 278      ( 04 Jun 2021 11:59 )             30.4       06-04    136  |  106  |  13  ----------------------------<  95  4.4   |  24  |  0.51    Ca    10.6<H>      04 Jun 2021 11:59  Phos  2.3     06-04  Mg     1.9     06-04    TPro  6.2  /  Alb  3.4  /  TBili  0.4  /  DBili  x   /  AST  12  /  ALT  20  /  AlkPhos  54  06-03          MICROBIOLOGY:    Culture Results:   No growth to date. (06-02 @ 12:36)  Culture Results:   No growth to date. (06-02 @ 12:36)      RADIOLOGY:    Blood cultures: NGTD     COVID-19 Esa Ab positive     < from: CT Abdomen and Pelvis w/ IV Cont (06.02.21 @ 13:57) >  FINDINGS:  LOWER CHEST: Breast implants.    LIVER: Within normal limits.  BILE DUCTS: Normal caliber.  GALLBLADDER: Within normal limits.  SPLEEN: Within normal limits.  PANCREAS: Within normal limits.  ADRENALS: Within normal limits.  KIDNEYS/URETERS: Within normal limits.    BLADDER: Nondistended.  REPRODUCTIVE ORGANS: Uterus and adnexa appear unremarkable.    BOWEL: No bowel obstruction. Appendix surgically absent.  PERITONEUM: No ascites.  VESSELS: Within normal limits.  RETROPERITONEUM/LYMPH NODES: Mildenlargement right inguinal and external iliac lymph nodes.  ABDOMINAL WALL: Nonloculated fluid and soft tissue infiltration in the subcutaneous fat of the anterior abdominal wall and both flanks. Skin thickening overlying the right flank. No subcutaneous air. No abscess.  BONES: Within normal limits.    IMPRESSION:  Postsurgical changes in the abdominal wall and both flanks. No CT evidence of abscess or gas forming infection.    < end of copied text >     Follow Up: Patient is a 36y old  Female who presents with a chief complaint of Per chart, pt is 36 year old female presenting with sepsis secondary to post-op infection. (04 Jun 2021 12:38)      Interval History/ROS:  Patient reports improved pain in the right thigh, denies any fever or overnight events. She reports headaches today.       Allergies    penicillin (Hives)    Intolerances        ANTIMICROBIALS:  vancomycin  IVPB 1250 every 12 hours      OTHER MEDS:  acetaminophen   Tablet .. 650 milliGRAM(s) Oral every 6 hours PRN  acetaminophen 325 mG/butalbital 50 mG/caffeine 40 mG 1 Tablet(s) Oral every 6 hours PRN  enoxaparin Injectable 40 milliGRAM(s) SubCutaneous every 24 hours  ferrous    sulfate 325 milliGRAM(s) Oral daily  ibuprofen  Tablet. 600 milliGRAM(s) Oral every 8 hours PRN  senna 2 Tablet(s) Oral at bedtime PRN      Vital Signs Last 24 Hrs  T(C): 36.7 (04 Jun 2021 06:45), Max: 36.7 (03 Jun 2021 13:26)  T(F): 98.1 (04 Jun 2021 06:45), Max: 98.1 (03 Jun 2021 13:26)  HR: 94 (04 Jun 2021 06:45) (94 - 100)  BP: 110/62 (04 Jun 2021 06:45) (110/62 - 118/73)  BP(mean): --  RR: 17 (04 Jun 2021 06:45) (17 - 18)  SpO2: 100% (04 Jun 2021 06:45) (100% - 100%)    REVIEW OF SYSTEMS  [  ] ROS unobtainable because:   [x] All other systems negative except as noted below:	    Constitutional:  [ ] fever [ ] chills  [ ] weight loss  [ ] weakness  Skin:  [x] rash [ ] phlebitis	  Eyes: [ ] icterus [ ] pain  [ ] discharge	  ENMT: [ ] sore throat  [ ] thrush [ ] ulcers [ ] exudates  Respiratory: [ ] dyspnea [ ] hemoptysis [ ] cough [ ] sputum	  Cardiovascular:  [ ] chest pain [ ] palpitations [ ] edema	  Gastrointestinal:  [ ] nausea [ ] vomiting [ ] diarrhea [ ] constipation [ ] pain	  Genitourinary:  [ ] dysuria [ ] frequency [ ] hematuria [ ] discharge [ ] flank pain  [ ] incontinence  Musculoskeletal:  [ ] myalgias [ ] arthralgias [ ] arthritis  [ ] back pain  Neurological:  [x] headache [ ] seizures  [ ] confusion/altered mental status  Psychiatric:  [ ] anxiety [ ] depression	  Endocrine:  [ ] adrenal [ ] thyroid  Allergic/Immunologic:	 [ ] transplant [ ] seasonal    PHYSICAL EXAM:  Constitutional: non-toxic, no distress  HEAD/EYES: anicteric, no conjunctival injection  ENT:  supple, no thrush  Cardiovascular:   normal S1, S2, no murmur, no edema  Respiratory:  clear BS bilaterally, no wheezes, no rales  GI:  soft, non-tender, normal bowel sounds  :  no lindsey, no CVA tenderness  Musculoskeletal:  no synovitis, normal ROM  Neurologic: awake and alert, normal strength, no focal findings  Skin:  + right thigh surgical site wound erythema, edema, induration, with purulence   Heme/Onc: no lymphadenopathy   Psychiatric:  awake, alert, appropriate mood                        9.0    4.53  )-----------( 278      ( 04 Jun 2021 11:59 )             30.4       06-04    136  |  106  |  13  ----------------------------<  95  4.4   |  24  |  0.51    Ca    10.6<H>      04 Jun 2021 11:59  Phos  2.3     06-04  Mg     1.9     06-04    TPro  6.2  /  Alb  3.4  /  TBili  0.4  /  DBili  x   /  AST  12  /  ALT  20  /  AlkPhos  54  06-03          MICROBIOLOGY:    Culture Results:   No growth to date. (06-02 @ 12:36)  Culture Results:   No growth to date. (06-02 @ 12:36)      RADIOLOGY:    Blood cultures: NGTD     COVID-19 Esa Ab positive     < from: CT Abdomen and Pelvis w/ IV Cont (06.02.21 @ 13:57) >  FINDINGS:  LOWER CHEST: Breast implants.    LIVER: Within normal limits.  BILE DUCTS: Normal caliber.  GALLBLADDER: Within normal limits.  SPLEEN: Within normal limits.  PANCREAS: Within normal limits.  ADRENALS: Within normal limits.  KIDNEYS/URETERS: Within normal limits.    BLADDER: Nondistended.  REPRODUCTIVE ORGANS: Uterus and adnexa appear unremarkable.    BOWEL: No bowel obstruction. Appendix surgically absent.  PERITONEUM: No ascites.  VESSELS: Within normal limits.  RETROPERITONEUM/LYMPH NODES: Mildenlargement right inguinal and external iliac lymph nodes.  ABDOMINAL WALL: Nonloculated fluid and soft tissue infiltration in the subcutaneous fat of the anterior abdominal wall and both flanks. Skin thickening overlying the right flank. No subcutaneous air. No abscess.  BONES: Within normal limits.    IMPRESSION:  Postsurgical changes in the abdominal wall and both flanks. No CT evidence of abscess or gas forming infection.    < end of copied text >     Follow Up: Patient is a 36y old  Female who presents with a chief complaint of Per chart, pt is 36 year old female presenting with sepsis secondary to post-op infection. (04 Jun 2021 12:38)      Interval History/ROS:  Patient reports improved pain in the right thigh, denies any fever or overnight events. She reports headaches today.     No fever  Right leg pain is a little better.  Had drainage form the leg    Allergies    penicillin (Hives)    Intolerances        ANTIMICROBIALS:  vancomycin  IVPB 1250 every 12 hours      OTHER MEDS:  acetaminophen   Tablet .. 650 milliGRAM(s) Oral every 6 hours PRN  acetaminophen 325 mG/butalbital 50 mG/caffeine 40 mG 1 Tablet(s) Oral every 6 hours PRN  enoxaparin Injectable 40 milliGRAM(s) SubCutaneous every 24 hours  ferrous    sulfate 325 milliGRAM(s) Oral daily  ibuprofen  Tablet. 600 milliGRAM(s) Oral every 8 hours PRN  senna 2 Tablet(s) Oral at bedtime PRN      Vital Signs Last 24 Hrs  T(C): 36.7 (04 Jun 2021 06:45), Max: 36.7 (03 Jun 2021 13:26)  T(F): 98.1 (04 Jun 2021 06:45), Max: 98.1 (03 Jun 2021 13:26)  HR: 94 (04 Jun 2021 06:45) (94 - 100)  BP: 110/62 (04 Jun 2021 06:45) (110/62 - 118/73)  BP(mean): --  RR: 17 (04 Jun 2021 06:45) (17 - 18)  SpO2: 100% (04 Jun 2021 06:45) (100% - 100%)    REVIEW OF SYSTEMS  [  ] ROS unobtainable because:   [x] All other systems negative except as noted below:	    Constitutional:  [ ] fever [ ] chills  [ ] weight loss  [ ] weakness  Skin:  [x] rash [ ] phlebitis	  Eyes: [ ] icterus [ ] pain  [ ] discharge	  ENMT: [ ] sore throat  [ ] thrush [ ] ulcers [ ] exudates  Respiratory: [ ] dyspnea [ ] hemoptysis [ ] cough [ ] sputum	  Cardiovascular:  [ ] chest pain [ ] palpitations [ ] edema	  Gastrointestinal:  [ ] nausea [ ] vomiting [ ] diarrhea [ ] constipation [ ] pain	  Genitourinary:  [ ] dysuria [ ] frequency [ ] hematuria [ ] discharge [ ] flank pain  [ ] incontinence  Musculoskeletal:  [ ] myalgias [ ] arthralgias [ ] arthritis  [ ] back pain  Neurological:  [x] headache [ ] seizures  [ ] confusion/altered mental status  Psychiatric:  [ ] anxiety [ ] depression	  Endocrine:  [ ] adrenal [ ] thyroid  Allergic/Immunologic:	 [ ] transplant [ ] seasonal    PHYSICAL EXAM:  Constitutional: non-toxic, no distress  HEAD/EYES: anicteric, no conjunctival injection  ENT:  supple, no thrush  Cardiovascular:   normal S1, S2, no murmur, no edema  Respiratory:  clear BS bilaterally, no wheezes, no rales  GI:  soft, non-tender, normal bowel sounds  :  no lindsey, no CVA tenderness  Musculoskeletal:  no synovitis, normal ROM  Neurologic: awake and alert, normal strength, no focal findings  Skin:  + right thigh surgical site wound erythema, edema, induration, with purulence   Heme/Onc: no lymphadenopathy   Psychiatric:  awake, alert, appropriate mood                        9.0    4.53  )-----------( 278      ( 04 Jun 2021 11:59 )             30.4       06-04    136  |  106  |  13  ----------------------------<  95  4.4   |  24  |  0.51    Ca    10.6<H>      04 Jun 2021 11:59  Phos  2.3     06-04  Mg     1.9     06-04    TPro  6.2  /  Alb  3.4  /  TBili  0.4  /  DBili  x   /  AST  12  /  ALT  20  /  AlkPhos  54  06-03          MICROBIOLOGY:    Culture Results:   No growth to date. (06-02 @ 12:36)  Culture Results:   No growth to date. (06-02 @ 12:36)      RADIOLOGY:    Blood cultures: NGTD     COVID-19 Esa Ab positive     < from: CT Abdomen and Pelvis w/ IV Cont (06.02.21 @ 13:57) >  FINDINGS:  LOWER CHEST: Breast implants.    LIVER: Within normal limits.  BILE DUCTS: Normal caliber.  GALLBLADDER: Within normal limits.  SPLEEN: Within normal limits.  PANCREAS: Within normal limits.  ADRENALS: Within normal limits.  KIDNEYS/URETERS: Within normal limits.    BLADDER: Nondistended.  REPRODUCTIVE ORGANS: Uterus and adnexa appear unremarkable.    BOWEL: No bowel obstruction. Appendix surgically absent.  PERITONEUM: No ascites.  VESSELS: Within normal limits.  RETROPERITONEUM/LYMPH NODES: Mildenlargement right inguinal and external iliac lymph nodes.  ABDOMINAL WALL: Nonloculated fluid and soft tissue infiltration in the subcutaneous fat of the anterior abdominal wall and both flanks. Skin thickening overlying the right flank. No subcutaneous air. No abscess.  BONES: Within normal limits.    IMPRESSION:  Postsurgical changes in the abdominal wall and both flanks. No CT evidence of abscess or gas forming infection.    < end of copied text >

## 2021-06-04 NOTE — PROGRESS NOTE ADULT - PROBLEM SELECTOR PLAN 4
unknown baseline, s/p recent sx  -Iron very low, will start iron tabs  -F/U B12/Folate  -monitor/trend

## 2021-06-05 LAB
ANION GAP SERPL CALC-SCNC: 11 MMOL/L — SIGNIFICANT CHANGE UP (ref 7–14)
BUN SERPL-MCNC: 11 MG/DL — SIGNIFICANT CHANGE UP (ref 7–23)
CALCIUM SERPL-MCNC: 10.8 MG/DL — HIGH (ref 8.4–10.5)
CHLORIDE SERPL-SCNC: 105 MMOL/L — SIGNIFICANT CHANGE UP (ref 98–107)
CO2 SERPL-SCNC: 23 MMOL/L — SIGNIFICANT CHANGE UP (ref 22–31)
CREAT SERPL-MCNC: 0.52 MG/DL — SIGNIFICANT CHANGE UP (ref 0.5–1.3)
GLUCOSE SERPL-MCNC: 106 MG/DL — HIGH (ref 70–99)
HCT VFR BLD CALC: 29.7 % — LOW (ref 34.5–45)
HGB BLD-MCNC: 8.9 G/DL — LOW (ref 11.5–15.5)
MAGNESIUM SERPL-MCNC: 1.9 MG/DL — SIGNIFICANT CHANGE UP (ref 1.6–2.6)
MCHC RBC-ENTMCNC: 26 PG — LOW (ref 27–34)
MCHC RBC-ENTMCNC: 30 GM/DL — LOW (ref 32–36)
MCV RBC AUTO: 86.8 FL — SIGNIFICANT CHANGE UP (ref 80–100)
NRBC # BLD: 0 /100 WBCS — SIGNIFICANT CHANGE UP
NRBC # FLD: 0 K/UL — SIGNIFICANT CHANGE UP
PHOSPHATE SERPL-MCNC: 2.9 MG/DL — SIGNIFICANT CHANGE UP (ref 2.5–4.5)
PLATELET # BLD AUTO: 297 K/UL — SIGNIFICANT CHANGE UP (ref 150–400)
POTASSIUM SERPL-MCNC: 4.2 MMOL/L — SIGNIFICANT CHANGE UP (ref 3.5–5.3)
POTASSIUM SERPL-SCNC: 4.2 MMOL/L — SIGNIFICANT CHANGE UP (ref 3.5–5.3)
RBC # BLD: 3.42 M/UL — LOW (ref 3.8–5.2)
RBC # FLD: 13.7 % — SIGNIFICANT CHANGE UP (ref 10.3–14.5)
SODIUM SERPL-SCNC: 139 MMOL/L — SIGNIFICANT CHANGE UP (ref 135–145)
WBC # BLD: 4.46 K/UL — SIGNIFICANT CHANGE UP (ref 3.8–10.5)
WBC # FLD AUTO: 4.46 K/UL — SIGNIFICANT CHANGE UP (ref 3.8–10.5)

## 2021-06-05 PROCEDURE — 99233 SBSQ HOSP IP/OBS HIGH 50: CPT | Mod: GC

## 2021-06-05 RX ORDER — LANOLIN ALCOHOL/MO/W.PET/CERES
3 CREAM (GRAM) TOPICAL ONCE
Refills: 0 | Status: COMPLETED | OUTPATIENT
Start: 2021-06-05 | End: 2021-06-05

## 2021-06-05 RX ADMIN — Medication 166.67 MILLIGRAM(S): at 18:19

## 2021-06-05 RX ADMIN — Medication 1 TABLET(S): at 12:51

## 2021-06-05 RX ADMIN — Medication 166.67 MILLIGRAM(S): at 07:04

## 2021-06-05 RX ADMIN — Medication 1 TABLET(S): at 13:51

## 2021-06-05 RX ADMIN — Medication 3 MILLIGRAM(S): at 02:35

## 2021-06-05 RX ADMIN — Medication 325 MILLIGRAM(S): at 12:51

## 2021-06-05 NOTE — PROGRESS NOTE ADULT - SUBJECTIVE AND OBJECTIVE BOX
PROGRESS NOTE:   Authored by Manuel Grossman MD, PGY1 [facility] Pager [phone] [facility] pager [phone]    Patient is a 36y old  Female who presents with a chief complaint of post-surgical infection (03 Jun 2021 14:38)      SUBJECTIVE / OVERNIGHT EVENTS: No acute events overnight.     REVIEW OF SYSTEMS:    CONSTITUTIONAL: No weakness, fevers or chills. +Headaches  EYES/ENT: No visual changes;  No vertigo or throat pain   NECK: No pain or stiffness  RESPIRATORY: No cough, wheezing, hemoptysis; No shortness of breath  CARDIOVASCULAR: No chest pain or palpitations  GASTROINTESTINAL: No abdominal or epigastric pain. No nausea, vomiting, or hematemesis; No diarrhea or constipation. No melena or hematochezia.  GENITOURINARY: No dysuria, frequency or hematuria  NEUROLOGICAL: No numbness or weakness  SKIN: No itching, rashes    MEDICATIONS  (STANDING):  enoxaparin Injectable 40 milliGRAM(s) SubCutaneous every 24 hours  ferrous    sulfate 325 milliGRAM(s) Oral daily  vancomycin  IVPB 1000 milliGRAM(s) IV Intermittent every 12 hours    MEDICATIONS  (PRN):  acetaminophen   Tablet .. 650 milliGRAM(s) Oral every 6 hours PRN Temp greater or equal to 38C (100.4F), Mild Pain (1 - 3)  acetaminophen 325 mG/butalbital 50 mG/caffeine 40 mG 1 Tablet(s) Oral every 6 hours PRN migraine headache  ketorolac   Injectable 15 milliGRAM(s) IV Push every 8 hours PRN Moderate Pain (4 - 6)  senna 2 Tablet(s) Oral at bedtime PRN Constipation      CAPILLARY BLOOD GLUCOSE        I&O's Summary      PHYSICAL EXAM:  Vital Signs Last 24 Hrs  T(C): 36.7 (04 Jun 2021 06:45), Max: 36.7 (03 Jun 2021 13:26)  T(F): 98.1 (04 Jun 2021 06:45), Max: 98.1 (03 Jun 2021 13:26)  HR: 94 (04 Jun 2021 06:45) (94 - 100)  BP: 110/62 (04 Jun 2021 06:45) (110/62 - 118/73)  BP(mean): --  RR: 17 (04 Jun 2021 06:45) (17 - 18)  SpO2: 100% (04 Jun 2021 06:45) (100% - 100%)    CONSTITUTIONAL: NAD, well-developed  RESPIRATORY: Normal respiratory effort; lungs are clear to auscultation bilaterally  CARDIOVASCULAR: Regular rate and rhythm, normal S1 and S2, no murmur/rub/gallop; No lower extremity edema; Peripheral pulses are 2+ bilaterally  ABDOMEN: Nontender to palpation, normoactive bowel sounds, no rebound/guarding; No hepatosplenomegaly  MUSCLOSKELETAL: no clubbing or cyanosis of digits; no joint swelling or tenderness to palpation  PSYCH: A+O to person, place, and time; affect appropriate  NEURO: Non-focal, no tremors  SKIN: R outer thigh cellulitis less erythematous and less TTP today. Wound draining purulent at center.     LABS:                        8.6    7.85  )-----------( 226      ( 03 Jun 2021 08:04 )             28.0     06-03    135  |  104  |  8   ----------------------------<  129<H>  4.2   |  20<L>  |  0.59    Ca    9.9      03 Jun 2021 08:04  Phos  1.6     06-03  Mg     1.9     06-03    TPro  6.2  /  Alb  3.4  /  TBili  0.4  /  DBili  x   /  AST  12  /  ALT  20  /  AlkPhos  54  06-03              Culture - Blood (collected 02 Jun 2021 12:36)  Source: .Blood Blood-Venous  Preliminary Report (03 Jun 2021 13:02):    No growth to date.    Culture - Blood (collected 02 Jun 2021 12:36)  Source: .Blood Blood-Peripheral  Preliminary Report (03 Jun 2021 13:02):    No growth to date.        RADIOLOGY & ADDITIONAL TESTS:  No new imaging or tests    COORDINATION OF CARE:  Care Discussed with Consultants/Other Providers [Y/N]:  Prior or Outpatient Records Reviewed [Y/N]:   PROGRESS NOTE:   Authored by Manuel Grossman MD, PGY1 [facility] Pager [phone] [facility] pager [phone]    Patient is a 36y old  Female who presents with a chief complaint of post-surgical infection (03 Jun 2021 14:38)      SUBJECTIVE / OVERNIGHT EVENTS: No acute events overnight. Pt without headache yesterday. Feels OK this AM, but just woke up. Notes leg pain isn't bad. No n/v/d. No fevers or chills.     REVIEW OF SYSTEMS:    CONSTITUTIONAL: No weakness, fevers or chills. +Headaches  EYES/ENT: No visual changes;  No vertigo or throat pain   NECK: No pain or stiffness  RESPIRATORY: No cough, wheezing, hemoptysis; No shortness of breath  CARDIOVASCULAR: No chest pain or palpitations  GASTROINTESTINAL: No abdominal or epigastric pain. No nausea, vomiting, or hematemesis; No diarrhea or constipation. No melena or hematochezia.  GENITOURINARY: No dysuria, frequency or hematuria  NEUROLOGICAL: No numbness or weakness  SKIN: No itching, rashes    MEDICATIONS  (STANDING):  enoxaparin Injectable 40 milliGRAM(s) SubCutaneous every 24 hours  ferrous    sulfate 325 milliGRAM(s) Oral daily  vancomycin  IVPB 1000 milliGRAM(s) IV Intermittent every 12 hours    MEDICATIONS  (PRN):  acetaminophen   Tablet .. 650 milliGRAM(s) Oral every 6 hours PRN Temp greater or equal to 38C (100.4F), Mild Pain (1 - 3)  acetaminophen 325 mG/butalbital 50 mG/caffeine 40 mG 1 Tablet(s) Oral every 6 hours PRN migraine headache  ketorolac   Injectable 15 milliGRAM(s) IV Push every 8 hours PRN Moderate Pain (4 - 6)  senna 2 Tablet(s) Oral at bedtime PRN Constipation      CAPILLARY BLOOD GLUCOSE        I&O's Summary      PHYSICAL EXAM:  Vital Signs Last 24 Hrs  T(C): 36.7 (04 Jun 2021 06:45), Max: 36.7 (03 Jun 2021 13:26)  T(F): 98.1 (04 Jun 2021 06:45), Max: 98.1 (03 Jun 2021 13:26)  HR: 94 (04 Jun 2021 06:45) (94 - 100)  BP: 110/62 (04 Jun 2021 06:45) (110/62 - 118/73)  BP(mean): --  RR: 17 (04 Jun 2021 06:45) (17 - 18)  SpO2: 100% (04 Jun 2021 06:45) (100% - 100%)    CONSTITUTIONAL: NAD, well-developed  RESPIRATORY: Normal respiratory effort; lungs are clear to auscultation bilaterally  CARDIOVASCULAR: Regular rate and rhythm, normal S1 and S2, no murmur/rub/gallop; No lower extremity edema; Peripheral pulses are 2+ bilaterally  ABDOMEN: Nontender to palpation, normoactive bowel sounds, no rebound/guarding; No hepatosplenomegaly  MUSCLOSKELETAL: no clubbing or cyanosis of digits; no joint swelling or tenderness to palpation  PSYCH: A+O to person, place, and time; affect appropriate  NEURO: Non-focal, no tremors  SKIN: R outer thigh cellulitis less erythematous and less TTP today. Wound draining at center, less drainage compared with yesterday.     LABS:                        8.6    7.85  )-----------( 226      ( 03 Jun 2021 08:04 )             28.0     06-03    135  |  104  |  8   ----------------------------<  129<H>  4.2   |  20<L>  |  0.59    Ca    9.9      03 Jun 2021 08:04  Phos  1.6     06-03  Mg     1.9     06-03    TPro  6.2  /  Alb  3.4  /  TBili  0.4  /  DBili  x   /  AST  12  /  ALT  20  /  AlkPhos  54  06-03              Culture - Blood (collected 02 Jun 2021 12:36)  Source: .Blood Blood-Venous  Preliminary Report (03 Jun 2021 13:02):    No growth to date.    Culture - Blood (collected 02 Jun 2021 12:36)  Source: .Blood Blood-Peripheral  Preliminary Report (03 Jun 2021 13:02):    No growth to date.        RADIOLOGY & ADDITIONAL TESTS:  No new imaging or tests    COORDINATION OF CARE:  Care Discussed with Consultants/Other Providers [Y/N]:  Prior or Outpatient Records Reviewed [Y/N]:

## 2021-06-06 VITALS
HEART RATE: 68 BPM | SYSTOLIC BLOOD PRESSURE: 122 MMHG | TEMPERATURE: 98 F | OXYGEN SATURATION: 100 % | DIASTOLIC BLOOD PRESSURE: 72 MMHG | RESPIRATION RATE: 18 BRPM

## 2021-06-06 LAB — VANCOMYCIN TROUGH SERPL-MCNC: 10.7 UG/ML — SIGNIFICANT CHANGE UP (ref 10–20)

## 2021-06-06 PROCEDURE — 99232 SBSQ HOSP IP/OBS MODERATE 35: CPT

## 2021-06-06 PROCEDURE — 99239 HOSP IP/OBS DSCHRG MGMT >30: CPT

## 2021-06-06 RX ORDER — CEPHALEXIN 500 MG
500 CAPSULE ORAL ONCE
Refills: 0 | Status: COMPLETED | OUTPATIENT
Start: 2021-06-06 | End: 2021-06-06

## 2021-06-06 RX ORDER — CEPHALEXIN 500 MG
500 CAPSULE ORAL ONCE
Refills: 0 | Status: DISCONTINUED | OUTPATIENT
Start: 2021-06-06 | End: 2021-06-06

## 2021-06-06 RX ORDER — LANOLIN ALCOHOL/MO/W.PET/CERES
6 CREAM (GRAM) TOPICAL ONCE
Refills: 0 | Status: COMPLETED | OUTPATIENT
Start: 2021-06-06 | End: 2021-06-06

## 2021-06-06 RX ORDER — AZTREONAM 2 G
1 VIAL (EA) INJECTION
Qty: 14 | Refills: 0
Start: 2021-06-06 | End: 2021-06-12

## 2021-06-06 RX ORDER — LANOLIN ALCOHOL/MO/W.PET/CERES
6 CREAM (GRAM) TOPICAL AT BEDTIME
Refills: 0 | Status: DISCONTINUED | OUTPATIENT
Start: 2021-06-06 | End: 2021-06-06

## 2021-06-06 RX ADMIN — Medication 6 MILLIGRAM(S): at 00:55

## 2021-06-06 RX ADMIN — Medication 166.67 MILLIGRAM(S): at 06:35

## 2021-06-06 RX ADMIN — Medication 325 MILLIGRAM(S): at 12:07

## 2021-06-06 NOTE — PROGRESS NOTE ADULT - SUBJECTIVE AND OBJECTIVE BOX
CC: Patient is a 36y old  Female who presents with a chief complaint of post-surgical infection (06 Jun 2021 08:49)    ID following for post surgical site infection    Interval History/ROS: Patient requesting to go home, asking for IV line to be removed. States improving, no pain, erythema improving. No fevers, no chills. Abscess cx and blood cultures so far no growth. WBC WNL. Patient expressed frustration being in the hospital for a week.    Rest of ROS negative.    Allergies  penicillin (Hives)    ANTIMICROBIALS:  vancomycin  IVPB 1250 every 12 hours      OTHER MEDS:  acetaminophen   Tablet .. 650 milliGRAM(s) Oral every 6 hours PRN  acetaminophen 325 mG/butalbital 50 mG/caffeine 40 mG 1 Tablet(s) Oral every 6 hours PRN  enoxaparin Injectable 40 milliGRAM(s) SubCutaneous every 24 hours  ferrous    sulfate 325 milliGRAM(s) Oral daily  ibuprofen  Tablet. 600 milliGRAM(s) Oral every 8 hours PRN  melatonin 6 milliGRAM(s) Oral at bedtime  senna 2 Tablet(s) Oral at bedtime PRN      PE:    Vital Signs Last 24 Hrs  T(C): 36.5 (06 Jun 2021 06:31), Max: 36.7 (05 Jun 2021 14:20)  T(F): 97.7 (06 Jun 2021 06:31), Max: 98 (05 Jun 2021 14:20)  HR: 65 (06 Jun 2021 06:31) (65 - 79)  BP: 119/78 (06 Jun 2021 06:31) (118/69 - 125/78)  BP(mean): --  RR: 18 (06 Jun 2021 06:31) (18 - 18)  SpO2: 100% (06 Jun 2021 06:31) (100% - 100%)    Gen: AOx3, NAD  CV: S1+S2 normal, no murmurs  Resp: Clear bilat, no resp distress  Abd: Soft, nontender, +BS  Ext: No LE edema, no wounds  : No [place]  IV/Skin: No thrombophlebitis, right thigh erythema with central induration  Neuro: no focal deficits    LABS:                          8.9    4.46  )-----------( 297      ( 05 Jun 2021 08:29 )             29.7       06-05    139  |  105  |  11  ----------------------------<  106<H>  4.2   |  23  |  0.52    Ca    10.8<H>      05 Jun 2021 08:29  Phos  2.9     06-05  Mg     1.9     06-05    MICROBIOLOGY:  Vancomycin Level, Trough: 10.7 ug/mL (06-06-21 @ 04:42)  v  .Abscess Leg - Right  06-03-21   No growth  --  --      .Blood Blood-Peripheral  06-02-21   No growth to date.  --  --    RADIOLOGY:    < from: CT Abdomen and Pelvis w/ IV Cont (06.02.21 @ 13:57) >  IMPRESSION:  Postsurgical changes in the abdominal wall and both flanks. No CT evidence of abscess or gas forming infection.    < end of copied text >

## 2021-06-06 NOTE — PROGRESS NOTE ADULT - ATTENDING COMMENTS
32 year old presents with right upper leg pain.  Associated fever a.    recent surgery.    Evolving right leg cellulitis. Concern for evolving abscess.     Concern for MRSA    Non-tuberculosis mycobacterium soft tissue infections have been reported after cosmetic procedures- but seems too early    Continue vancomycin    therapeutic drug monitoring of vancomycin  ----check through.  Goal is over 10    Follow up culture results    If not improving, surgery eval     Call the ID service with questions or concerns over the weekend.  [phone]
37 yo F with recent cosmetic surgery to buttock p/w sepsis 2/2 cellulitis.   -CT no abscess or gas formation   -blood cx NGTD. wound cx pending  -c/w vanco adjusted repeat trough prior to 4th dose   -plastic surgery consulted, f/u recs  -c/w local wound care
37 yo F with recent cosmetic surgery to buttock p/w sepsis 2/2 cellulitis.   -CT no abscess or gas formation   -blood cx NGTD. wound cx pending  -plastic surgery consulted- reviewed no acute intervention. If worsens can consider RT thigh US    -c/w local wound care  - Pt requesting to go home states she is improving--does not want to stay in hospital--  bactrim 1 ds tab po bid and cefadroxil 500 mg po q 12 hours to complete an additional 7 days. Advised to return to ED if worsening pain fluctuance, erythema or fever. outpt f/u with ID. discharge 36 min
37 yo F with recent cosmetic surgery to buttock p/w sepsis 2/2 cellulitis.   -clinically improving. fever resolved  -CT no abscess or gas formation   -blood cx NGTD. wound cx pending  -c/w vanco   -plastic surgery consulted, f/u recs  -c/w local wound care
35 yo F with recent cosmetic surgery to buttock p/w sepsis 2/2 cellulitis   -CT no abscess or gas formation   -started on vanco with improvement, will continue   -pain control with toradol prn

## 2021-06-06 NOTE — DISCHARGE NOTE NURSING/CASE MANAGEMENT/SOCIAL WORK - PATIENT PORTAL LINK FT
You can access the FollowMyHealth Patient Portal offered by [facility] by registering at the following website: [email or web address]. By joining [email or web address] FollowMyHealth portal, you will also be able to view your health information using other applications (apps) compatible with our system.

## 2021-06-06 NOTE — PROGRESS NOTE ADULT - PROBLEM SELECTOR PLAN 1
Meets SIRS criteria based on fever, tachycardia. Sepsis 2/2 post-op cellulitis.   No leukocytosis but with left shift. Lactate 0.9  CT- Postsurgical changes in the abdominal wall and both flanks. No CT evidence of abscess or gas forming infection.  continue to monitor R hip erythema  -Bcx NGTD  -Wound Cx NGTD  -ID following  -cont vanc, trough goal >10  -Plastic surgery consulted
Meets SIRS criteria based on fever, tachycardia. Sepsis 2/2 post-op cellulitis.   No leukocytosis but with left shift. Lactate 0.9  CT- Postsurgical changes in the abdominal wall and both flanks. No CT evidence of abscess or gas forming infection.  continue to monitor R hip erythema  -Bcx NGTD  -F/U wound cx  -ID consulted, A/W vanc Will continue to appreciate recs  -Vanc trough goal >10. Increased to 1250 q12h for low trough 6/4.   -Plastic surgery consulted, will appreciate recs.
Meets SIRS criteria based on fever, tachycardia. Sepsis 2/2 post-op cellulitis.   No leukocytosis but with left shift. Lactate 0.9  CT- Postsurgical changes in the abdominal wall and both flanks. No CT evidence of abscess or gas forming infection.  continue to monitor R hip erythema  -Bcx NGTD  -F/U wound cx  -ID consulted, A/W vanc 1g q12h. Will continue to appreciate recs  -Vanc trough goal >10.
Meets SIRS criteria based on fever, tachycardia. Sepsis 2/2 post-op cellulitis.   Per ED note: abx discussed with ID, recommended vancomycin  No leukocytosis, will check lactate with AM labs, c/w IVF overnight  CT- Postsurgical changes in the abdominal wall and both flanks. No CT evidence of abscess or gas forming infection.  continue to monitor R hip erythema  f/u Cx

## 2021-06-06 NOTE — CONSULT NOTE ADULT - SUBJECTIVE AND OBJECTIVE BOX
Patient evaluated yesterday morning and this morning.  Full consult note to be dictated.  s/p liposuction/BBL in [place]  Admitted with R thigh cellulitis; WBC wnl; CT scan negative for gas or abscess.    PE: R thigh erythema improved from yesterday's exam; +serous fluid draining from punctate opening in R lateral thigh; no fluctuance R thigh; +firm, indurated soft tissue    A+P:  -patient improving on IV antibiotics  -continue IV antibiotics, warm compressed RLE  -CT scan images reviewed; no abscess, however, lateral-most aspect R thigh cut-off from images. If patient stalls in her improvement on IV antibiotics, would consider US R thigh to r/o now-organized collection.  -please call [phone] if patient does not continue to improve Patient evaluated yesterday morning and this morning.  Full consult note to be dictated.  s/p liposuction/BBL in [place]  Admitted with R thigh cellulitis; WBC wnl; CT scan negative for gas or abscess.    PE: R thigh erythema improved from yesterday's exam; +serous fluid draining from punctate opening in R lateral thigh; no fluctuance R thigh; +firm, indurated soft tissue    A+P:  -patient improving on IV antibiotics  -continue IV antibiotics, warm compresses RLE  -CT scan images reviewed; no abscess, however, lateral-most aspect R thigh cut-off from images. If patient does not continue to improvement on IV antibiotics, would consider US R thigh to r/o now-organized collection.  -please call [phone] with additional questions

## 2021-06-06 NOTE — PROGRESS NOTE ADULT - SUBJECTIVE AND OBJECTIVE BOX
PROGRESS NOTE:     Liz Armas MD/PhD PGY-2  Internal Medicine [facility]  Pager: [phone]  **After 7pm, please contact night float**    Patient is a 36y old  Female who presents with a chief complaint of post-surgical infection (05 Jun 2021 06:58)      SUBJECTIVE / OVERNIGHT EVENTS: No acute events ON.     ADDITIONAL REVIEW OF SYSTEMS:    MEDICATIONS  (STANDING):  enoxaparin Injectable 40 milliGRAM(s) SubCutaneous every 24 hours  ferrous    sulfate 325 milliGRAM(s) Oral daily  melatonin 6 milliGRAM(s) Oral at bedtime  vancomycin  IVPB 1250 milliGRAM(s) IV Intermittent every 12 hours    MEDICATIONS  (PRN):  acetaminophen   Tablet .. 650 milliGRAM(s) Oral every 6 hours PRN Temp greater or equal to 38C (100.4F), Mild Pain (1 - 3)  acetaminophen 325 mG/butalbital 50 mG/caffeine 40 mG 1 Tablet(s) Oral every 6 hours PRN migraine headache  ibuprofen  Tablet. 600 milliGRAM(s) Oral every 8 hours PRN Mild Pain (1 - 3), Moderate Pain (4 - 6)  senna 2 Tablet(s) Oral at bedtime PRN Constipation      CAPILLARY BLOOD GLUCOSE        I&O's Summary      PHYSICAL EXAM:  Vital Signs Last 24 Hrs  T(C): 36.5 (06 Jun 2021 06:31), Max: 36.7 (05 Jun 2021 14:20)  T(F): 97.7 (06 Jun 2021 06:31), Max: 98 (05 Jun 2021 14:20)  HR: 65 (06 Jun 2021 06:31) (65 - 79)  BP: 119/78 (06 Jun 2021 06:31) (118/69 - 125/78)  BP(mean): --  RR: 18 (06 Jun 2021 06:31) (18 - 18)  SpO2: 100% (06 Jun 2021 06:31) (100% - 100%)    CONSTITUTIONAL: NAD, well-developed  RESPIRATORY: CTAB, no wheezes, rales, rhonchi. Good inspiratory effort  CARDIOVASCULAR: RRR, no murmurs, rubs, gallops. 2+ brachioradial and pedal pulses. Limbs warm and well-perfused. No edema.  ABDOMEN: Soft, nontender, nondistended. Normoactive bowel sounds. No HSM.  MUSCULOSKELETAL: Normal tone and strength. No trauma.  PSYCH: AOx3. Appropriate affect.    LABS:                        8.9    4.46  )-----------( 297      ( 05 Jun 2021 08:29 )             29.7     06-05    139  |  105  |  11  ----------------------------<  106<H>  4.2   |  23  |  0.52    Ca    10.8<H>      05 Jun 2021 08:29  Phos  2.9     06-05  Mg     1.9     06-05                Culture - Abscess with Gram Stain (collected 03 Jun 2021 23:05)  Source: .Abscess Leg - Right  Preliminary Report (05 Jun 2021 07:56):    No growth         PROGRESS NOTE:     Liz Armas MD/PhD PGY-2  Internal Medicine [facility]  Pager: [phone]  **After 7pm, please contact night float**    Patient is a 36y old  Female who presents with a chief complaint of post-surgical infection (05 Jun 2021 06:58)      SUBJECTIVE / OVERNIGHT EVENTS: No acute events ON.     ADDITIONAL REVIEW OF SYSTEMS: Irritation at IV site. Says she is leaving today whether she is cleared or not.     MEDICATIONS  (STANDING):  enoxaparin Injectable 40 milliGRAM(s) SubCutaneous every 24 hours  ferrous    sulfate 325 milliGRAM(s) Oral daily  melatonin 6 milliGRAM(s) Oral at bedtime  vancomycin  IVPB 1250 milliGRAM(s) IV Intermittent every 12 hours    MEDICATIONS  (PRN):  acetaminophen   Tablet .. 650 milliGRAM(s) Oral every 6 hours PRN Temp greater or equal to 38C (100.4F), Mild Pain (1 - 3)  acetaminophen 325 mG/butalbital 50 mG/caffeine 40 mG 1 Tablet(s) Oral every 6 hours PRN migraine headache  ibuprofen  Tablet. 600 milliGRAM(s) Oral every 8 hours PRN Mild Pain (1 - 3), Moderate Pain (4 - 6)  senna 2 Tablet(s) Oral at bedtime PRN Constipation      CAPILLARY BLOOD GLUCOSE        I&O's Summary      PHYSICAL EXAM:  Vital Signs Last 24 Hrs  T(C): 36.5 (06 Jun 2021 06:31), Max: 36.7 (05 Jun 2021 14:20)  T(F): 97.7 (06 Jun 2021 06:31), Max: 98 (05 Jun 2021 14:20)  HR: 65 (06 Jun 2021 06:31) (65 - 79)  BP: 119/78 (06 Jun 2021 06:31) (118/69 - 125/78)  BP(mean): --  RR: 18 (06 Jun 2021 06:31) (18 - 18)  SpO2: 100% (06 Jun 2021 06:31) (100% - 100%)    CONSTITUTIONAL: NAD, well-developed  RESPIRATORY: CTAB, no wheezes, rales, rhonchi. Good inspiratory effort  CARDIOVASCULAR: RRR, no murmurs, rubs, gallops. 2+ brachioradial and pedal pulses. Limbs warm and well-perfused. No edema.  ABDOMEN: Soft, nontender, nondistended. Normoactive bowel sounds. No HSM.  MUSCULOSKELETAL: Normal tone and strength. No trauma. Erythema at right hip, bandaged w/ mild drainage. Improving.   PSYCH: AOx3. Appropriate affect.    LABS:                        8.9    4.46  )-----------( 297      ( 05 Jun 2021 08:29 )             29.7     06-05    139  |  105  |  11  ----------------------------<  106<H>  4.2   |  23  |  0.52    Ca    10.8<H>      05 Jun 2021 08:29  Phos  2.9     06-05  Mg     1.9     06-05                Culture - Abscess with Gram Stain (collected 03 Jun 2021 23:05)  Source: .Abscess Leg - Right  Preliminary Report (05 Jun 2021 07:56):    No growth

## 2021-06-06 NOTE — PROGRESS NOTE ADULT - REASON FOR ADMISSION
post-surgical infection

## 2021-06-06 NOTE — PROGRESS NOTE ADULT - PROVIDER SPECIALTY LIST ADULT
Infectious Disease
Internal Medicine
Infectious Disease
Internal Medicine

## 2021-06-06 NOTE — PROGRESS NOTE ADULT - ASSESSMENT
32 year old presents with right upper leg pain.  Associated fever.    Recent surgery.    Evolving right leg cellulitis. CT negative for abscess.     Concern for MRSA given recent procedure    Non-tuberculosis mycobacterium soft tissue infections have been reported after cosmetic procedures- but seems too early    Continue vancomycin  Therapeutic drug monitoring of vancomycin  Monitor trough.  Goal is over 10  Culture results so far negative in blood and abscess cx  Has remained afebrile and WBC WNL  Patient requesting to go home, asking for IV line to be removed.  If for discharged can transition patient to bactrim 1 ds tab po bid and cefadroxil 500 mg po q 12 hours to complete an additional 7 days.  (Patient does not want doxycycline after explained about photosensitivity - she does not want to avoid sunlight while on therapy)  Explained to the patient that these infections can evolve into an abscess - to look for worsening erythema, fluctuance, fever, worsening pain - If any of these, to return to the ED.   Can followup in ID clinic. For appointments can call [phone].    Discussed case with primary team.    Maximino Dai MD  Pager [phone]  After 5pm/weekends call [phone]

## 2021-06-07 LAB
CULTURE RESULTS: SIGNIFICANT CHANGE UP
CULTURE RESULTS: SIGNIFICANT CHANGE UP
SPECIMEN SOURCE: SIGNIFICANT CHANGE UP
SPECIMEN SOURCE: SIGNIFICANT CHANGE UP

## 2021-06-07 NOTE — CHART NOTE - NSCHARTNOTEFT_GEN_A_CORE
Patient discharged over the weekend.    I called the patient, she states that she is not having fever and that her leg feels about the same.  She has improved since presentation to the hospital.    I advised the patient to continue the antibiotics as prescribed.    If the area is not improving or if it gets worse, she should follow up at the ID office and with surgery.    If she is febrile or feels worse, she should return to the ED.    The patient expressed understanding of this plan.

## 2021-06-11 LAB
CULTURE RESULTS: SIGNIFICANT CHANGE UP
SPECIMEN SOURCE: SIGNIFICANT CHANGE UP

## 2022-07-22 NOTE — PROGRESS NOTE ADULT - PROBLEM SELECTOR PLAN 5
"Requested Prescriptions   Pending Prescriptions Disp Refills     clindamycin (CLINDAMAX) 1 % external gel 60 g 3     Sig: Apply topically 2 times daily       Topical Acne Medications Protocol Passed - 7/22/2022  8:19 AM        Passed - Patient is 12 years of age or older        Passed - Recent (12 mo) or future (30 days) visit within the authorizing provider's specialty     Patient has had an office visit with the authorizing provider or a provider within the authorizing providers department within the previous 12 mos or has a future within next 30 days. See \"Patient Info\" tab in inbasket, or \"Choose Columns\" in Meds & Orders section of the refill encounter.              Passed - Medication is active on med list           Original rx sent by provider outside of clinic, will rout to PCP to approve.    Kristen MAX RN    " #DVT ppx - enoxaparin given recent sx and decreased mobility  #Diet, regular

## 2023-04-27 NOTE — ED ADULT NURSE NOTE - CAS EDN DISCHARGE INTERVENTIONS
Detail Level: Detailed Quality 226: Preventive Care And Screening: Tobacco Use: Screening And Cessation Intervention: Patient screened for tobacco use and is an ex/non-smoker Quality 130: Documentation Of Current Medications In The Medical Record: Current Medications Documented Quality 110: Preventive Care And Screening: Influenza Immunization: Influenza Immunization Administered during Influenza season Quality 402: Tobacco Use And Help With Quitting Among Adolescents: Patient screened for tobacco and never smoked Quality 431: Preventive Care And Screening: Unhealthy Alcohol Use - Screening: Patient not identified as an unhealthy alcohol user when screened for unhealthy alcohol use using a systematic screening method IV intact

## 2025-01-01 NOTE — ED CDU PROVIDER INITIAL DAY NOTE - NS ED MD PROGRESS NOTE PROVIDER NAME2 FT
- Pt consult  - Oxycodone 10mg TID for moderate pain  - Oxycodone 15mg TID for severe pain ZACK Ware